# Patient Record
Sex: FEMALE | Race: WHITE | NOT HISPANIC OR LATINO | Employment: FULL TIME | URBAN - METROPOLITAN AREA
[De-identification: names, ages, dates, MRNs, and addresses within clinical notes are randomized per-mention and may not be internally consistent; named-entity substitution may affect disease eponyms.]

---

## 2018-04-18 ENCOUNTER — TRANSCRIBE ORDERS (OUTPATIENT)
Dept: ADMINISTRATIVE | Facility: HOSPITAL | Age: 58
End: 2018-04-18

## 2018-04-18 DIAGNOSIS — Z12.39 BREAST SCREENING: Primary | ICD-10-CM

## 2018-04-25 ENCOUNTER — HOSPITAL ENCOUNTER (OUTPATIENT)
Dept: MAMMOGRAPHY | Facility: CLINIC | Age: 58
Discharge: HOME | End: 2018-04-25
Payer: COMMERCIAL

## 2018-04-25 DIAGNOSIS — Z12.39 BREAST SCREENING: ICD-10-CM

## 2018-04-25 PROCEDURE — 77067 SCR MAMMO BI INCL CAD: CPT

## 2018-05-16 ENCOUNTER — HOSPITAL ENCOUNTER (OUTPATIENT)
Dept: MAMMOGRAPHY | Facility: CLINIC | Age: 58
Discharge: HOME | End: 2018-05-16
Payer: COMMERCIAL

## 2018-05-16 ENCOUNTER — HOSPITAL ENCOUNTER (OUTPATIENT)
Dept: ULTRASOUND IMAGING | Facility: CLINIC | Age: 58
Discharge: HOME | End: 2018-05-16
Payer: COMMERCIAL

## 2018-05-16 DIAGNOSIS — R92.8 ABNORMAL MAMMOGRAM: ICD-10-CM

## 2018-05-16 PROCEDURE — 77065 DX MAMMO INCL CAD UNI: CPT

## 2018-05-16 PROCEDURE — G0279 TOMOSYNTHESIS, MAMMO: HCPCS

## 2021-12-13 ENCOUNTER — TELEPHONE (OUTPATIENT)
Dept: OBGYN CLINIC | Facility: HOSPITAL | Age: 61
End: 2021-12-13

## 2021-12-14 ENCOUNTER — TELEPHONE (OUTPATIENT)
Dept: OBGYN CLINIC | Facility: HOSPITAL | Age: 61
End: 2021-12-14

## 2021-12-20 ENCOUNTER — HOSPITAL ENCOUNTER (OUTPATIENT)
Dept: RADIOLOGY | Facility: HOSPITAL | Age: 61
Discharge: HOME | End: 2021-12-20
Payer: COMMERCIAL

## 2021-12-20 ENCOUNTER — OFFICE VISIT (OUTPATIENT)
Dept: OBGYN CLINIC | Facility: HOSPITAL | Age: 61
End: 2021-12-20
Payer: COMMERCIAL

## 2021-12-20 VITALS
SYSTOLIC BLOOD PRESSURE: 170 MMHG | HEIGHT: 63 IN | HEART RATE: 86 BPM | BODY MASS INDEX: 26.01 KG/M2 | WEIGHT: 146.8 LBS | DIASTOLIC BLOOD PRESSURE: 84 MMHG

## 2021-12-20 DIAGNOSIS — M25.559 HIP PAIN: Primary | ICD-10-CM

## 2021-12-20 DIAGNOSIS — M25.559 HIP PAIN: ICD-10-CM

## 2021-12-20 DIAGNOSIS — M25.551 BILATERAL HIP PAIN: ICD-10-CM

## 2021-12-20 DIAGNOSIS — M25.552 BILATERAL HIP PAIN: ICD-10-CM

## 2021-12-20 PROCEDURE — 73521 X-RAY EXAM HIPS BI 2 VIEWS: CPT

## 2021-12-20 PROCEDURE — 99204 OFFICE O/P NEW MOD 45 MIN: CPT | Performed by: PHYSICIAN ASSISTANT

## 2021-12-20 RX ORDER — AMLODIPINE BESYLATE 5 MG/1
5 TABLET ORAL DAILY
COMMUNITY
Start: 2021-11-29

## 2021-12-20 RX ORDER — MELOXICAM 15 MG/1
15 TABLET ORAL DAILY PRN
Qty: 21 TABLET | Refills: 0 | Status: SHIPPED | OUTPATIENT
Start: 2021-12-20 | End: 2022-01-07

## 2022-01-07 DIAGNOSIS — M25.551 BILATERAL HIP PAIN: ICD-10-CM

## 2022-01-07 DIAGNOSIS — M25.552 BILATERAL HIP PAIN: ICD-10-CM

## 2022-01-07 RX ORDER — MELOXICAM 15 MG/1
TABLET ORAL
Qty: 21 TABLET | Refills: 0 | Status: ON HOLD | OUTPATIENT
Start: 2022-01-07 | End: 2022-07-06 | Stop reason: ALTCHOICE

## 2022-07-06 ENCOUNTER — ANESTHESIA (INPATIENT)
Dept: PERIOP | Facility: HOSPITAL | Age: 62
DRG: 337 | End: 2022-07-06
Payer: COMMERCIAL

## 2022-07-06 ENCOUNTER — APPOINTMENT (INPATIENT)
Dept: RADIOLOGY | Facility: HOSPITAL | Age: 62
DRG: 337 | End: 2022-07-06
Payer: COMMERCIAL

## 2022-07-06 ENCOUNTER — HOSPITAL ENCOUNTER (INPATIENT)
Facility: HOSPITAL | Age: 62
LOS: 1 days | Discharge: HOME | DRG: 337 | End: 2022-07-07
Attending: EMERGENCY MEDICINE | Admitting: SURGERY
Payer: COMMERCIAL

## 2022-07-06 ENCOUNTER — APPOINTMENT (EMERGENCY)
Dept: RADIOLOGY | Facility: HOSPITAL | Age: 62
DRG: 337 | End: 2022-07-06
Payer: COMMERCIAL

## 2022-07-06 ENCOUNTER — ANESTHESIA EVENT (INPATIENT)
Dept: PERIOP | Facility: HOSPITAL | Age: 62
DRG: 337 | End: 2022-07-06
Payer: COMMERCIAL

## 2022-07-06 DIAGNOSIS — K56.609 SMALL BOWEL OBSTRUCTION (HCC): Primary | ICD-10-CM

## 2022-07-06 LAB
ABO GROUP BLD: NORMAL
ALBUMIN SERPL BCP-MCNC: 3.6 G/DL (ref 3.5–5)
ALP SERPL-CCNC: 84 U/L (ref 46–116)
ALT SERPL W P-5'-P-CCNC: 31 U/L (ref 12–78)
ANION GAP SERPL CALCULATED.3IONS-SCNC: 5 MMOL/L (ref 4–13)
AST SERPL W P-5'-P-CCNC: 33 U/L (ref 5–45)
BASOPHILS # BLD AUTO: 0.07 THOUSANDS/ΜL (ref 0–0.1)
BASOPHILS NFR BLD AUTO: 1 % (ref 0–1)
BILIRUB SERPL-MCNC: 0.5 MG/DL (ref 0.2–1)
BILIRUB UR QL STRIP: NEGATIVE
BUN SERPL-MCNC: 21 MG/DL (ref 5–25)
CALCIUM SERPL-MCNC: 9.7 MG/DL (ref 8.3–10.1)
CHLORIDE SERPL-SCNC: 107 MMOL/L (ref 100–108)
CLARITY UR: CLEAR
CO2 SERPL-SCNC: 22 MMOL/L (ref 21–32)
COLOR UR: YELLOW
CREAT SERPL-MCNC: 0.81 MG/DL (ref 0.6–1.3)
EOSINOPHIL # BLD AUTO: 0.27 THOUSAND/ΜL (ref 0–0.61)
EOSINOPHIL NFR BLD AUTO: 3 % (ref 0–6)
ERYTHROCYTE [DISTWIDTH] IN BLOOD BY AUTOMATED COUNT: 12.5 % (ref 11.6–15.1)
FLUAV RNA RESP QL NAA+PROBE: NEGATIVE
FLUBV RNA RESP QL NAA+PROBE: NEGATIVE
GFR SERPL CREATININE-BSD FRML MDRD: 78 ML/MIN/1.73SQ M
GLUCOSE SERPL-MCNC: 105 MG/DL (ref 65–140)
GLUCOSE SERPL-MCNC: 116 MG/DL (ref 65–140)
GLUCOSE UR STRIP-MCNC: NEGATIVE MG/DL
HCT VFR BLD AUTO: 42.8 % (ref 34.8–46.1)
HGB BLD-MCNC: 14.6 G/DL (ref 11.5–15.4)
HGB UR QL STRIP.AUTO: NEGATIVE
IMM GRANULOCYTES # BLD AUTO: 0.01 THOUSAND/UL (ref 0–0.2)
IMM GRANULOCYTES NFR BLD AUTO: 0 % (ref 0–2)
KETONES UR STRIP-MCNC: ABNORMAL MG/DL
LACTATE SERPL-SCNC: 1 MMOL/L (ref 0.5–2)
LEUKOCYTE ESTERASE UR QL STRIP: NEGATIVE
LIPASE SERPL-CCNC: 175 U/L (ref 73–393)
LYMPHOCYTES # BLD AUTO: 2.72 THOUSANDS/ΜL (ref 0.6–4.47)
LYMPHOCYTES NFR BLD AUTO: 31 % (ref 14–44)
MCH RBC QN AUTO: 30.8 PG (ref 26.8–34.3)
MCHC RBC AUTO-ENTMCNC: 34.1 G/DL (ref 31.4–37.4)
MCV RBC AUTO: 90 FL (ref 82–98)
MONOCYTES # BLD AUTO: 0.47 THOUSAND/ΜL (ref 0.17–1.22)
MONOCYTES NFR BLD AUTO: 5 % (ref 4–12)
NEUTROPHILS # BLD AUTO: 5.18 THOUSANDS/ΜL (ref 1.85–7.62)
NEUTS SEG NFR BLD AUTO: 60 % (ref 43–75)
NITRITE UR QL STRIP: NEGATIVE
NRBC BLD AUTO-RTO: 0 /100 WBCS
PH UR STRIP.AUTO: 7 [PH]
PLATELET # BLD AUTO: 250 THOUSANDS/UL (ref 149–390)
PMV BLD AUTO: 9.6 FL (ref 8.9–12.7)
POTASSIUM SERPL-SCNC: 4.7 MMOL/L (ref 3.5–5.3)
PROT SERPL-MCNC: 7.2 G/DL (ref 6.4–8.2)
PROT UR STRIP-MCNC: NEGATIVE MG/DL
RBC # BLD AUTO: 4.74 MILLION/UL (ref 3.81–5.12)
RH BLD: POSITIVE
RSV RNA RESP QL NAA+PROBE: NEGATIVE
SARS-COV-2 RNA RESP QL NAA+PROBE: NEGATIVE
SODIUM SERPL-SCNC: 134 MMOL/L (ref 136–145)
SP GR UR STRIP.AUTO: 1.01 (ref 1–1.03)
UROBILINOGEN UR STRIP-ACNC: <2 MG/DL
WBC # BLD AUTO: 8.72 THOUSAND/UL (ref 4.31–10.16)

## 2022-07-06 PROCEDURE — 44005 FREEING OF BOWEL ADHESION: CPT | Performed by: SURGERY

## 2022-07-06 PROCEDURE — 74176 CT ABD & PELVIS W/O CONTRAST: CPT

## 2022-07-06 PROCEDURE — 99285 EMERGENCY DEPT VISIT HI MDM: CPT | Performed by: EMERGENCY MEDICINE

## 2022-07-06 PROCEDURE — G1004 CDSM NDSC: HCPCS

## 2022-07-06 PROCEDURE — 80053 COMPREHEN METABOLIC PANEL: CPT | Performed by: STUDENT IN AN ORGANIZED HEALTH CARE EDUCATION/TRAINING PROGRAM

## 2022-07-06 PROCEDURE — 81003 URINALYSIS AUTO W/O SCOPE: CPT | Performed by: STUDENT IN AN ORGANIZED HEALTH CARE EDUCATION/TRAINING PROGRAM

## 2022-07-06 PROCEDURE — C9290 INJ, BUPIVACAINE LIPOSOME: HCPCS | Performed by: ANESTHESIOLOGY

## 2022-07-06 PROCEDURE — 0241U HB NFCT DS VIR RESP RNA 4 TRGT: CPT | Performed by: SURGERY

## 2022-07-06 PROCEDURE — 82948 REAGENT STRIP/BLOOD GLUCOSE: CPT

## 2022-07-06 PROCEDURE — 96361 HYDRATE IV INFUSION ADD-ON: CPT

## 2022-07-06 PROCEDURE — 74177 CT ABD & PELVIS W/CONTRAST: CPT

## 2022-07-06 PROCEDURE — 83690 ASSAY OF LIPASE: CPT | Performed by: STUDENT IN AN ORGANIZED HEALTH CARE EDUCATION/TRAINING PROGRAM

## 2022-07-06 PROCEDURE — 36415 COLL VENOUS BLD VENIPUNCTURE: CPT | Performed by: STUDENT IN AN ORGANIZED HEALTH CARE EDUCATION/TRAINING PROGRAM

## 2022-07-06 PROCEDURE — 0DNW0ZZ RELEASE PERITONEUM, OPEN APPROACH: ICD-10-PCS | Performed by: SURGERY

## 2022-07-06 PROCEDURE — 99222 1ST HOSP IP/OBS MODERATE 55: CPT | Performed by: SURGERY

## 2022-07-06 PROCEDURE — 85025 COMPLETE CBC W/AUTO DIFF WBC: CPT | Performed by: STUDENT IN AN ORGANIZED HEALTH CARE EDUCATION/TRAINING PROGRAM

## 2022-07-06 PROCEDURE — 71045 X-RAY EXAM CHEST 1 VIEW: CPT

## 2022-07-06 PROCEDURE — 96374 THER/PROPH/DIAG INJ IV PUSH: CPT

## 2022-07-06 PROCEDURE — 99285 EMERGENCY DEPT VISIT HI MDM: CPT

## 2022-07-06 PROCEDURE — 83605 ASSAY OF LACTIC ACID: CPT | Performed by: STUDENT IN AN ORGANIZED HEALTH CARE EDUCATION/TRAINING PROGRAM

## 2022-07-06 RX ORDER — MAGNESIUM HYDROXIDE 1200 MG/15ML
LIQUID ORAL AS NEEDED
Status: DISCONTINUED | OUTPATIENT
Start: 2022-07-06 | End: 2022-07-06 | Stop reason: HOSPADM

## 2022-07-06 RX ORDER — OXYCODONE HYDROCHLORIDE 5 MG/1
5 TABLET ORAL EVERY 4 HOURS PRN
Status: DISCONTINUED | OUTPATIENT
Start: 2022-07-06 | End: 2022-07-07 | Stop reason: HOSPADM

## 2022-07-06 RX ORDER — PROPOFOL 10 MG/ML
INJECTION, EMULSION INTRAVENOUS AS NEEDED
Status: DISCONTINUED | OUTPATIENT
Start: 2022-07-06 | End: 2022-07-06

## 2022-07-06 RX ORDER — HYDROMORPHONE HCL/PF 1 MG/ML
0.5 SYRINGE (ML) INJECTION
Status: DISCONTINUED | OUTPATIENT
Start: 2022-07-06 | End: 2022-07-07

## 2022-07-06 RX ORDER — LIDOCAINE HYDROCHLORIDE 10 MG/ML
INJECTION, SOLUTION EPIDURAL; INFILTRATION; INTRACAUDAL; PERINEURAL AS NEEDED
Status: DISCONTINUED | OUTPATIENT
Start: 2022-07-06 | End: 2022-07-06

## 2022-07-06 RX ORDER — HYDROMORPHONE HCL IN WATER/PF 6 MG/30 ML
0.2 PATIENT CONTROLLED ANALGESIA SYRINGE INTRAVENOUS
Status: DISCONTINUED | OUTPATIENT
Start: 2022-07-06 | End: 2022-07-06

## 2022-07-06 RX ORDER — GABAPENTIN 100 MG/1
100 CAPSULE ORAL 3 TIMES DAILY
Status: DISCONTINUED | OUTPATIENT
Start: 2022-07-06 | End: 2022-07-07 | Stop reason: HOSPADM

## 2022-07-06 RX ORDER — SUCCINYLCHOLINE/SOD CL,ISO/PF 100 MG/5ML
SYRINGE (ML) INTRAVENOUS AS NEEDED
Status: DISCONTINUED | OUTPATIENT
Start: 2022-07-06 | End: 2022-07-06

## 2022-07-06 RX ORDER — ACETAMINOPHEN 325 MG/1
975 TABLET ORAL EVERY 8 HOURS SCHEDULED
Status: DISCONTINUED | OUTPATIENT
Start: 2022-07-06 | End: 2022-07-07 | Stop reason: HOSPADM

## 2022-07-06 RX ORDER — SODIUM CHLORIDE, SODIUM LACTATE, POTASSIUM CHLORIDE, CALCIUM CHLORIDE 600; 310; 30; 20 MG/100ML; MG/100ML; MG/100ML; MG/100ML
100 INJECTION, SOLUTION INTRAVENOUS CONTINUOUS
Status: DISCONTINUED | OUTPATIENT
Start: 2022-07-06 | End: 2022-07-07

## 2022-07-06 RX ORDER — SODIUM CHLORIDE 9 MG/ML
INJECTION, SOLUTION INTRAVENOUS CONTINUOUS PRN
Status: DISCONTINUED | OUTPATIENT
Start: 2022-07-06 | End: 2022-07-06

## 2022-07-06 RX ORDER — METRONIDAZOLE 500 MG/100ML
INJECTION, SOLUTION INTRAVENOUS CONTINUOUS PRN
Status: DISCONTINUED | OUTPATIENT
Start: 2022-07-06 | End: 2022-07-06

## 2022-07-06 RX ORDER — XYLITOL/YERBA SANTA
5 AEROSOL, SPRAY WITH PUMP (ML) MUCOUS MEMBRANE 4 TIMES DAILY PRN
Status: DISCONTINUED | OUTPATIENT
Start: 2022-07-06 | End: 2022-07-06

## 2022-07-06 RX ORDER — FENTANYL CITRATE 50 UG/ML
INJECTION, SOLUTION INTRAMUSCULAR; INTRAVENOUS AS NEEDED
Status: DISCONTINUED | OUTPATIENT
Start: 2022-07-06 | End: 2022-07-06

## 2022-07-06 RX ORDER — CEFAZOLIN SODIUM 1 G/3ML
INJECTION, POWDER, FOR SOLUTION INTRAMUSCULAR; INTRAVENOUS AS NEEDED
Status: DISCONTINUED | OUTPATIENT
Start: 2022-07-06 | End: 2022-07-06

## 2022-07-06 RX ORDER — LABETALOL HYDROCHLORIDE 5 MG/ML
10 INJECTION, SOLUTION INTRAVENOUS EVERY 4 HOURS PRN
Status: DISCONTINUED | OUTPATIENT
Start: 2022-07-06 | End: 2022-07-07 | Stop reason: HOSPADM

## 2022-07-06 RX ORDER — HEPARIN SODIUM 5000 [USP'U]/ML
5000 INJECTION, SOLUTION INTRAVENOUS; SUBCUTANEOUS EVERY 8 HOURS SCHEDULED
Status: DISCONTINUED | OUTPATIENT
Start: 2022-07-06 | End: 2022-07-07 | Stop reason: HOSPADM

## 2022-07-06 RX ORDER — BUPIVACAINE HYDROCHLORIDE 2.5 MG/ML
INJECTION, SOLUTION EPIDURAL; INFILTRATION; INTRACAUDAL AS NEEDED
Status: DISCONTINUED | OUTPATIENT
Start: 2022-07-06 | End: 2022-07-06

## 2022-07-06 RX ORDER — FLUTICASONE PROPIONATE 50 MCG
1 SPRAY, SUSPENSION (ML) NASAL DAILY
COMMUNITY

## 2022-07-06 RX ORDER — HYDROMORPHONE HCL/PF 1 MG/ML
0.5 SYRINGE (ML) INJECTION ONCE
Status: COMPLETED | OUTPATIENT
Start: 2022-07-06 | End: 2022-07-06

## 2022-07-06 RX ORDER — HYDROMORPHONE HCL/PF 1 MG/ML
0.4 SYRINGE (ML) INJECTION
Status: DISCONTINUED | OUTPATIENT
Start: 2022-07-06 | End: 2022-07-06 | Stop reason: HOSPADM

## 2022-07-06 RX ORDER — ROCURONIUM BROMIDE 10 MG/ML
INJECTION, SOLUTION INTRAVENOUS AS NEEDED
Status: DISCONTINUED | OUTPATIENT
Start: 2022-07-06 | End: 2022-07-06

## 2022-07-06 RX ORDER — FENTANYL CITRATE/PF 50 MCG/ML
25 SYRINGE (ML) INJECTION
Status: DISCONTINUED | OUTPATIENT
Start: 2022-07-06 | End: 2022-07-06 | Stop reason: HOSPADM

## 2022-07-06 RX ORDER — OXYCODONE HYDROCHLORIDE 10 MG/1
10 TABLET ORAL EVERY 4 HOURS PRN
Status: DISCONTINUED | OUTPATIENT
Start: 2022-07-06 | End: 2022-07-07 | Stop reason: HOSPADM

## 2022-07-06 RX ORDER — ONDANSETRON 2 MG/ML
4 INJECTION INTRAMUSCULAR; INTRAVENOUS EVERY 6 HOURS PRN
Status: DISCONTINUED | OUTPATIENT
Start: 2022-07-06 | End: 2022-07-07 | Stop reason: HOSPADM

## 2022-07-06 RX ORDER — METHOCARBAMOL 500 MG/1
500 TABLET, FILM COATED ORAL EVERY 6 HOURS SCHEDULED
Status: DISCONTINUED | OUTPATIENT
Start: 2022-07-06 | End: 2022-07-07 | Stop reason: HOSPADM

## 2022-07-06 RX ORDER — MIDAZOLAM HYDROCHLORIDE 2 MG/2ML
INJECTION, SOLUTION INTRAMUSCULAR; INTRAVENOUS AS NEEDED
Status: DISCONTINUED | OUTPATIENT
Start: 2022-07-06 | End: 2022-07-06

## 2022-07-06 RX ADMIN — HYDROMORPHONE HYDROCHLORIDE 0.2 MG: 0.2 INJECTION, SOLUTION INTRAMUSCULAR; INTRAVENOUS; SUBCUTANEOUS at 10:25

## 2022-07-06 RX ADMIN — CEFAZOLIN SODIUM 2000 MG: 1 INJECTION, POWDER, FOR SOLUTION INTRAMUSCULAR; INTRAVENOUS at 14:25

## 2022-07-06 RX ADMIN — METHOCARBAMOL 500 MG: 500 TABLET ORAL at 19:51

## 2022-07-06 RX ADMIN — METRONIDAZOLE: 500 INJECTION, SOLUTION INTRAVENOUS at 14:22

## 2022-07-06 RX ADMIN — MIDAZOLAM 2 MG: 1 INJECTION INTRAMUSCULAR; INTRAVENOUS at 14:06

## 2022-07-06 RX ADMIN — BUPIVACAINE 20 ML: 13.3 INJECTION, SUSPENSION, LIPOSOMAL INFILTRATION at 15:15

## 2022-07-06 RX ADMIN — FENTANYL CITRATE 25 MCG: 50 INJECTION INTRAMUSCULAR; INTRAVENOUS at 14:11

## 2022-07-06 RX ADMIN — FENTANYL CITRATE 25 MCG: 50 INJECTION INTRAMUSCULAR; INTRAVENOUS at 14:39

## 2022-07-06 RX ADMIN — Medication 1 SPRAY: at 12:09

## 2022-07-06 RX ADMIN — SUGAMMADEX 200 MG: 100 INJECTION, SOLUTION INTRAVENOUS at 15:17

## 2022-07-06 RX ADMIN — HYDROMORPHONE HYDROCHLORIDE 0.5 MG: 1 INJECTION, SOLUTION INTRAMUSCULAR; INTRAVENOUS; SUBCUTANEOUS at 07:42

## 2022-07-06 RX ADMIN — OXYCODONE HYDROCHLORIDE 5 MG: 5 TABLET ORAL at 19:50

## 2022-07-06 RX ADMIN — SODIUM CHLORIDE, SODIUM LACTATE, POTASSIUM CHLORIDE, AND CALCIUM CHLORIDE 100 ML/HR: .6; .31; .03; .02 INJECTION, SOLUTION INTRAVENOUS at 21:09

## 2022-07-06 RX ADMIN — Medication 1 SPRAY: at 10:19

## 2022-07-06 RX ADMIN — SODIUM CHLORIDE: 0.9 INJECTION, SOLUTION INTRAVENOUS at 14:21

## 2022-07-06 RX ADMIN — SODIUM CHLORIDE, SODIUM LACTATE, POTASSIUM CHLORIDE, AND CALCIUM CHLORIDE 125 ML/HR: .6; .31; .03; .02 INJECTION, SOLUTION INTRAVENOUS at 10:20

## 2022-07-06 RX ADMIN — NICOTINE 1 PATCH: 7 PATCH, EXTENDED RELEASE TRANSDERMAL at 10:19

## 2022-07-06 RX ADMIN — SODIUM CHLORIDE, SODIUM LACTATE, POTASSIUM CHLORIDE, AND CALCIUM CHLORIDE: .6; .31; .03; .02 INJECTION, SOLUTION INTRAVENOUS at 14:53

## 2022-07-06 RX ADMIN — FENTANYL CITRATE 25 MCG: 50 INJECTION INTRAMUSCULAR; INTRAVENOUS at 15:07

## 2022-07-06 RX ADMIN — PROPOFOL 50 MG: 10 INJECTION, EMULSION INTRAVENOUS at 15:14

## 2022-07-06 RX ADMIN — ACETAMINOPHEN 975 MG: 325 TABLET, FILM COATED ORAL at 21:05

## 2022-07-06 RX ADMIN — ROCURONIUM BROMIDE 40 MG: 10 INJECTION INTRAVENOUS at 14:18

## 2022-07-06 RX ADMIN — FENTANYL CITRATE 25 MCG: 50 INJECTION INTRAMUSCULAR; INTRAVENOUS at 15:16

## 2022-07-06 RX ADMIN — Medication 100 MG: at 14:12

## 2022-07-06 RX ADMIN — BUPIVACAINE HYDROCHLORIDE 15 ML: 2.5 INJECTION, SOLUTION EPIDURAL; INFILTRATION; INTRACAUDAL at 15:15

## 2022-07-06 RX ADMIN — GABAPENTIN 100 MG: 100 CAPSULE ORAL at 21:05

## 2022-07-06 RX ADMIN — SODIUM CHLORIDE 1000 ML: 0.9 INJECTION, SOLUTION INTRAVENOUS at 07:44

## 2022-07-06 RX ADMIN — IOHEXOL 65 ML: 350 INJECTION, SOLUTION INTRAVENOUS at 10:47

## 2022-07-06 RX ADMIN — ONDANSETRON 4 MG: 2 INJECTION INTRAMUSCULAR; INTRAVENOUS at 14:41

## 2022-07-06 RX ADMIN — LIDOCAINE HYDROCHLORIDE 50 MG: 10 INJECTION, SOLUTION EPIDURAL; INFILTRATION; INTRACAUDAL at 14:11

## 2022-07-06 RX ADMIN — HEPARIN SODIUM 5000 UNITS: 5000 INJECTION INTRAVENOUS; SUBCUTANEOUS at 21:05

## 2022-07-06 RX ADMIN — BUPIVACAINE HYDROCHLORIDE 15 ML: 2.5 INJECTION, SOLUTION EPIDURAL; INFILTRATION; INTRACAUDAL at 15:10

## 2022-07-06 RX ADMIN — PROPOFOL 150 MG: 10 INJECTION, EMULSION INTRAVENOUS at 14:11

## 2022-07-06 NOTE — ANESTHESIA PREPROCEDURE EVALUATION
Procedure:  LAPAROSCOPY DIAGNOSTIC (N/A Abdomen)  LAPAROTOMY EXPLORATORY W/ BOWEL RESECTION (N/A Abdomen)    SBO    Relevant Problems   CARDIO   (+) Hypertension        Physical Exam    Airway    Mallampati score: I  TM Distance: >3 FB  Neck ROM: full     Dental   upper dentures and lower dentures,     Cardiovascular      Pulmonary      Other Findings        Anesthesia Plan  ASA Score- 2 Emergent    Anesthesia Type- general with ASA Monitors  Additional Monitors:   Airway Plan: ETT  Comment: Consented for pre-emergence TAP blocks  Plan Factors-    Chart reviewed  Existing labs reviewed  Patient summary reviewed  Patient is a current smoker  Patient not instructed to abstain from smoking on day of procedure  Patient smoked on day of surgery  Induction- intravenous and rapid sequence induction  Postoperative Plan- Plan for postoperative opioid use  Planned trial extubation    Informed Consent- Anesthetic plan and risks discussed with patient  I personally reviewed this patient with the CRNA  Discussed and agreed on the Anesthesia Plan with the CRNA  Sherry Delaney

## 2022-07-06 NOTE — QUICK NOTE
General Surgery Post-Op Check  Desean Mary 64 y o  female MRN: [id number]  Unit/Bed#: OR POOL Encounter: [id number]     S:   Patient seen and examined in PACU, as patient currently awaiting bed still  No acute complaints  No nausea or emesis  Pain well controlled      O:   Vitals:    07/06/22 1700   BP: 148/75   Pulse: 66   Resp: 14   Temp:    SpO2: 97%     I/O       07/04 0701 07/05 0700 07/05 0701 07/06 0700 07/06 0701 07/07 0700    I V    800    IV Piggyback   1100    Total Intake   1900    Urine   300    Blood   25    Total Output   325    Net   +1575               PE:  Gen:  No acute distress  CV:  Warm, well-perfused  Lung:  Normal work of breathing, no respiratory distress  Abd:  Soft, appropriately tender, dressing c/d/i  Ext:  Moving all extremities  Neuro:  Alert and oriented, M/S grossly intact     Lab Results   Component Value Date    WBC 8 72 07/06/2022    HGB 14 6 07/06/2022    HCT 42 8 07/06/2022    MCV 90 07/06/2022     07/06/2022     Lab Results   Component Value Date    CALCIUM 9 7 07/06/2022    K 4 7 07/06/2022    CO2 22 07/06/2022     07/06/2022    BUN 21 07/06/2022    CREATININE 0 81 07/06/2022         A/P: 64 y o  female Day of Surgery s/p Procedure(s) (LRB):  LAPAROTOMY EXPLORATORY, LYSIS OF ADHESIONS (N/A)    Plan:   CLD   DVT ppx   Out of bed, encourage ambulation   Encourage incentive spirometer use   Strict I's and O's   Pain and nausea control p r n   o B/l TAP blocks performed intraop   Please tiger text on call surgery resident for questions or concerns      Augustin Olivarez MD  PGY1, General Surgery

## 2022-07-06 NOTE — H&P
Acute Care Surgery  History and Physical  Rob Crockett 64 y o  female MRN: [id number]  Unit/Bed#: ED 23 Encounter: [id number]    Assessment and Plan:  Rob Crockett is a 64 y o  female with a PMHx HTN, prior tubal ligation (approx 30 years ago), hysteroscopy w/endometrial ablation, who presents with acute abdominal pain c/f SBO  CT scan without contrast c/f swirling of mesentery   VSS, labs unremarkable  Abdomin tender intraumbilical/LLQ but w/o peritoneal signs    Plan:  Repeat CTAP w/IV contrast to r/o internal hernia  NPO  IVF  NGT - monitor outpt and character, low continuous sxn  Surgery admission        History of Present Illness     HPI:  Rob Crockett is a 64 y o  female w/PMHx HTN who presents with acute onset abdominal pain  Patient states she had two BMs earlier this morning  She subsequently developed acute abdominal pain in her lower abdomin  She denies nausea or emesis  No fevers or chills  Prior surgical history including tubal ligation approx 30 years ago and hysteroscopy w/endometrial ablation  States she is burping but not passing flatus at this time  VSS, labs unremarkable  CT c/f SBO w/swirling of mesentery, no defined transition pt however, collapsed SB leading to TI  Review of Systems    Historical Information   Past Medical History:   Diagnosis Date    Hypertension      No past surgical history on file  Social History   Social History     Substance and Sexual Activity   Alcohol Use Not Currently     Social History     Substance and Sexual Activity   Drug Use Not Currently     Social History     Tobacco Use   Smoking Status Current Every Day Smoker   Smokeless Tobacco Never Used     Family History: No family history on file  Meds/Allergies   PTA meds:   Prior to Admission Medications   Prescriptions Last Dose Informant Patient Reported? Taking?    amLODIPine (NORVASC) 5 mg tablet   Yes No   meloxicam (MOBIC) 15 mg tablet   No No   Sig: TAKE 1 TABLET BY MOUTH DAILY AS NEEDED FOR MODERATE PAIN  Facility-Administered Medications: None     No Known Allergies    Objective   First Vitals:   Blood Pressure: (!) 191/84 (07/06/22 0713)  Pulse: 72 (07/06/22 0713)  Temperature: 98 2 °F (36 8 °C) (07/06/22 0713)  Temp Source: Tympanic (07/06/22 0713)  Respirations: 22 (07/06/22 0713)  SpO2: 98 % (07/06/22 0713)    Current Vitals:   Blood Pressure: (!) 191/84 (07/06/22 0713)  Pulse: 72 (07/06/22 0713)  Temperature: 98 2 °F (36 8 °C) (07/06/22 0713)  Temp Source: Tympanic (07/06/22 0713)  Respirations: 22 (07/06/22 0713)  SpO2: 98 % (07/06/22 0713)      Intake/Output Summary (Last 24 hours) at 7/6/2022 0944  Last data filed at 7/6/2022 0856  Gross per 24 hour   Intake 1000 ml   Output --   Net 1000 ml       Invasive Devices  Report    Peripheral Intravenous Line  Duration           Peripheral IV 07/06/22 Right Forearm <1 day          Drain  Duration           NG/OG/Enteral Tube Nasogastric 14 Fr Left nare <1 day                Physical Exam  Vitals reviewed  Constitutional:       Appearance: Normal appearance  She is not ill-appearing or toxic-appearing  HENT:      Head: Normocephalic and atraumatic  Right Ear: External ear normal       Left Ear: External ear normal       Nose: Nose normal       Mouth/Throat:      Mouth: Mucous membranes are moist       Pharynx: Oropharynx is clear  Eyes:      Extraocular Movements: Extraocular movements intact  Conjunctiva/sclera: Conjunctivae normal    Cardiovascular:      Rate and Rhythm: Normal rate  Pulmonary:      Effort: Pulmonary effort is normal  No respiratory distress  Abdominal:      General: Abdomen is flat  There is no distension  Palpations: Abdomen is soft  Tenderness: There is abdominal tenderness (infraumbilical/LLQ)  There is no rebound  Genitourinary:     Comments: deferred  Musculoskeletal:         General: Normal range of motion  Cervical back: Normal range of motion     Skin:     General: Skin is warm and dry    Neurological:      General: No focal deficit present  Mental Status: She is alert  Cranial Nerves: No cranial nerve deficit  Motor: No weakness  Psychiatric:         Mood and Affect: Mood normal          Behavior: Behavior normal          Thought Content: Thought content normal          Lab Results:   CBC:   Lab Results   Component Value Date    WBC 8 72 07/06/2022    HGB 14 6 07/06/2022    HCT 42 8 07/06/2022    MCV 90 07/06/2022     07/06/2022    MCH 30 8 07/06/2022    MCHC 34 1 07/06/2022    RDW 12 5 07/06/2022    MPV 9 6 07/06/2022    NRBC 0 07/06/2022   , CMP:   Lab Results   Component Value Date    SODIUM 134 (L) 07/06/2022    K 4 7 07/06/2022     07/06/2022    CO2 22 07/06/2022    BUN 21 07/06/2022    CREATININE 0 81 07/06/2022    CALCIUM 9 7 07/06/2022    AST 33 07/06/2022    ALT 31 07/06/2022    ALKPHOS 84 07/06/2022    EGFR 78 07/06/2022   , Coagulation: No results found for: PT, INR, APTT, Urinalysis:   Lab Results   Component Value Date    COLORU Yellow 07/06/2022    CLARITYU Clear 07/06/2022    SPECGRAV 1 009 07/06/2022    PHUR 7 0 07/06/2022    LEUKOCYTESUR Negative 07/06/2022    NITRITE Negative 07/06/2022    GLUCOSEU Negative 07/06/2022    KETONESU Trace (A) 07/06/2022    BILIRUBINUR Negative 07/06/2022    BLOODU Negative 07/06/2022   , Amylase: No results found for: AMYLASE, Lipase:   Lab Results   Component Value Date    LIPASE 175 07/06/2022     Imaging: I have personally reviewed pertinent reports  EKG, Pathology, and Other Studies: I have personally reviewed pertinent reports  Code Status: Level 1 - Full Code  Advance Directive and Living Will:      Power of :    POLST:      Counseling / Coordination of Care  Total floor / unit time spent today 30 minutes  This involved direct patient contact where I performed a full history and physical, reviewed previous records, and reviewed laboratory and other diagnostic studies   Greater than 50% of total time was spent with the patient and / or family counseling and / or coordination of care      Augustin Olivarez MD  7/6/2022

## 2022-07-06 NOTE — OP NOTE
OPERATIVE REPORT  PATIENT NAME: Chris Ye    :  1960  MRN: [id number]  Pt Location: [facility] OR ROOM 07    SURGERY DATE: 2022    Surgeon(s) and Role:     * Aki Son DO - Primary     * Dom Mayer MD - Assisting     Stewart Carvajal DO -  [facility], [place] - Assisting    Preop Diagnosis:  Small bowel obstruction ([address] ) [K56 609]    Post-Op Diagnosis Codes:     * Small bowel obstruction ([address] ) [R35 [id number]]    Procedure(s) (LRB):  LAPAROTOMY EXPLORATORY, LYSIS OF ADHESIONS (N/A)    Specimen(s):  * No specimens in log *    Estimated Blood Loss:   Minimal    Drains:  NG/OG/Enteral Tube Nasogastric 14 Fr Left nare (Active)   Placement Reverification Aspiration 22 0937   Status Suction-low continuous 22 0937   Number of days: 0       [REMOVED] Urethral Catheter Latex 16 Fr  (Removed)   Number of days: 0       Anesthesia Type:   General    Operative Indications:  Small bowel obstruction ([address] ) [B27 [id number]]      Operative Findings:  Single band adhesion in pelvis causing an internal hernia  All viable and healthy bowel    Complications:   None    Procedure and Technique:  Patient was met in the preop holding area and was identified by name and patient identification bracelet  She was brought to the operating room and placed in the supine position  General endotracheal anesthesia was induced  A karimi catheter was placed  A time-out was called and all parties were in agreement  A vertical lower midline incision was made from just below to the umbilicus to just above the pubic tubercle  This was deepened through the subcutaneous tissues and hemostasis was achieved with electocautery  The linea alba was identified and incised and the peritoneal cavity was entered  Upon entering the abdominal cavity mildly dilated small bowel was encountered  Careful exploration of the abdomen was then performed  The small bowel was run from the ligament of Treitz to the ileocecal valve   The bowel was noted to mildly dilated proximally  There was an adhesive band in the pelvis causing an internal hernia with decompressed loops distally  This was divided sharply  The entire small bowel was inspected for viability and the absence of any additional obstructing bands  The small bowel was once again ran from the ligament of treitz to the ilecocecal valve and no abnormalities were appreciated  The colon was examined and appeared normal  The abdomen was then copiously irrigated with warm normal saline  The fascia was then closed with two running 0 non looped PDS sutures which were tied in the middle  The skin was closed with staples and covered with a Mepilex dressing  The patient was extubated and brought to PACU in stable condition  Instrument, sponge, and needle counts were correct and confirmed by RF dominga Walton was present for the entire procedure      Patient Disposition:  PACU       SIGNATURE: Christo Uribe,   DATE: July 6, 2022  TIME: 3:09 PM

## 2022-07-06 NOTE — ED PROVIDER NOTES
History  Chief Complaint   Patient presents with    Abdominal Pain     Pt woke up this morning with abdominal pain  Denies NVD  BMx2 this morning, denies trouble urinating     HPI  63 yo female with past medical history of hypertension who presents complaint of abdominal pain  The pain began this morning and is constant and has progressively gotten worse  The pain is suprapubic and radiates the left flank  She denies any fever, chills, nausea, vomiting  She has had 2 bowel movements this morning which were normal nonbloody  She states the pain is at 8/10  Pain improves when she lays down she cannot describe any other palliative or exacerbating factors  She denies any chest pain or shortness of breath  Prior to Admission Medications   Prescriptions Last Dose Informant Patient Reported? Taking? amLODIPine (NORVASC) 5 mg tablet 2022 at Unknown time  Yes Yes   Sig: Take 5 mg by mouth daily   fluticasone (FLONASE) 50 mcg/act nasal spray   Yes Yes   Si spray into each nostril daily      Facility-Administered Medications: None       Past Medical History:   Diagnosis Date    Hypertension        Past Surgical History:   Procedure Laterality Date    EXPLORATORY LAPAROTOMY W/ BOWEL RESECTION N/A 2022    Procedure: LAPAROTOMY EXPLORATORY, LYSIS OF ADHESIONS;  Surgeon: Demar Sparks DO;  Location: [facility];  Service: General       No family history on file  I have reviewed and agree with the history as documented  E-Cigarette/Vaping    E-Cigarette Use Never User      E-Cigarette/Vaping Substances     Social History     Tobacco Use    Smoking status: Current Every Day Smoker    Smokeless tobacco: Never Used   Vaping Use    Vaping Use: Never used   Substance Use Topics    Alcohol use: Not Currently    Drug use: Not Currently        Review of Systems   Constitutional: Negative for chills and fever  HENT: Negative for congestion, ear pain, rhinorrhea and sore throat      Eyes: Negative for pain    Respiratory: Negative for apnea, cough, choking, chest tightness, shortness of breath, wheezing and stridor  Cardiovascular: Negative for chest pain, palpitations and leg swelling  Gastrointestinal: Positive for abdominal pain  Negative for constipation, diarrhea, nausea and vomiting  Genitourinary: Negative for hematuria  Musculoskeletal: Negative for arthralgias and back pain  Skin: Negative for rash and wound  Neurological: Negative for dizziness  Psychiatric/Behavioral: Negative for agitation and hallucinations  All other systems reviewed and are negative  Physical Exam  ED Triage Vitals [07/06/22 0713]   Temperature Pulse Respirations Blood Pressure SpO2   98 2 °F (36 8 °C) 72 22 (!) 191/84 98 %      Temp Source Heart Rate Source Patient Position - Orthostatic VS BP Location FiO2 (%)   Tympanic Monitor Lying Left arm --      Pain Score       8             Orthostatic Vital Signs  Vitals:    07/06/22 2155 07/06/22 2259 07/07/22 0246 07/07/22 0731   BP: 131/67 130/67 129/64 131/63   Pulse: 70 70 70 64   Patient Position - Orthostatic VS:           Physical Exam  Vitals reviewed  Constitutional:       General: She is not in acute distress  Appearance: She is well-developed  HENT:      Head: Normocephalic and atraumatic  Right Ear: External ear normal       Left Ear: External ear normal       Nose: Nose normal  No congestion or rhinorrhea  Mouth/Throat:      Mouth: Mucous membranes are moist       Pharynx: No oropharyngeal exudate or posterior oropharyngeal erythema  Eyes:      General:         Right eye: No discharge  Left eye: No discharge  Extraocular Movements: Extraocular movements intact  Conjunctiva/sclera: Conjunctivae normal       Pupils: Pupils are equal, round, and reactive to light  Cardiovascular:      Rate and Rhythm: Normal rate and regular rhythm  Pulses: Normal pulses  Heart sounds: Normal heart sounds     Pulmonary: Effort: Pulmonary effort is normal  No respiratory distress  Breath sounds: Normal breath sounds  No wheezing or rales  Abdominal:      Palpations: Abdomen is soft  Tenderness: There is no abdominal tenderness  Musculoskeletal:         General: No tenderness  Normal range of motion  Cervical back: Normal range of motion and neck supple  No rigidity  Skin:     General: Skin is warm  Findings: No erythema or rash  Neurological:      General: No focal deficit present  Mental Status: She is alert and oriented to person, place, and time  Cranial Nerves: No cranial nerve deficit  Sensory: No sensory deficit  Motor: No weakness        Coordination: Coordination normal    Psychiatric:         Mood and Affect: Mood normal          ED Medications  Medications   HYDROmorphone (DILAUDID) injection 0 5 mg (0 5 mg Intravenous Given 7/6/22 0742)   sodium chloride 0 9 % bolus 1,000 mL (0 mL Intravenous Stopped 7/6/22 0856)   iohexol (OMNIPAQUE) 350 MG/ML injection (MULTI-DOSE) 65 mL (65 mL Intravenous Given 7/6/22 1047)   potassium chloride oral solution 40 mEq (40 mEq Oral Given 7/7/22 1235)       Diagnostic Studies  Results Reviewed     Procedure Component Value Units Date/Time    Basic metabolic panel [[id number]] Collected: 07/07/22 0540    Lab Status: Final result Specimen: Blood from Arm, Left Updated: 07/07/22 3118     Sodium 139 mmol/L      Potassium 3 8 mmol/L      Chloride 108 mmol/L      CO2 25 mmol/L      ANION GAP 6 mmol/L      BUN 9 mg/dL      Creatinine 0 69 mg/dL      Glucose 110 mg/dL      Calcium 9 1 mg/dL      eGFR 94 ml/min/1 73sq m     Narrative:      [place] guidelines for Chronic Kidney Disease (CKD):     Stage 1 with normal or high GFR (GFR > 90 mL/min/1 73 square meters)    Stage 2 Mild CKD (GFR = 60-89 mL/min/1 73 square meters)    Stage 3A Moderate CKD (GFR = 45-59 mL/min/1 73 square meters)    Stage 3B Moderate CKD (GFR = 30-44 mL/min/1 73 square meters)    Stage 4 Severe CKD (GFR = 15-29 mL/min/1 73 square meters)    Stage 5 End Stage CKD (GFR <15 mL/min/1 73 square meters)  Note: GFR calculation is accurate only with a steady state creatinine    CBC and differential [[id number]] Collected: 07/07/22 0540    Lab Status: Final result Specimen: Blood from Arm, Left Updated: 07/07/22 0635     WBC 10 09 Thousand/uL      RBC 4 53 Million/uL      Hemoglobin 13 9 g/dL      Hematocrit 42 8 %      MCV 95 fL      MCH 30 7 pg      MCHC 32 5 g/dL      RDW 12 6 %      MPV 9 6 fL      Platelets 383 Thousands/uL      nRBC 0 /100 WBCs      Neutrophils Relative 72 %      Immat GRANS % 0 %      Lymphocytes Relative 20 %      Monocytes Relative 6 %      Eosinophils Relative 2 %      Basophils Relative 0 %      Neutrophils Absolute 7 20 Thousands/µL      Immature Grans Absolute 0 02 Thousand/uL      Lymphocytes Absolute 2 05 Thousands/µL      Monocytes Absolute 0 60 Thousand/µL      Eosinophils Absolute 0 18 Thousand/µL      Basophils Absolute 0 04 Thousands/µL     FLU/RSV/COVID - if FLU/RSV clinically relevant [[id number]]  (Normal) Collected: 07/06/22 1209    Lab Status: Final result Specimen: Nares from Nose Updated: 07/06/22 1306     SARS-CoV-2 Negative     INFLUENZA A PCR Negative     INFLUENZA B PCR Negative     RSV PCR Negative    Narrative:      FOR PEDIATRIC PATIENTS - copy/paste COVID Guidelines URL to browser: [email or web address]    SARS-CoV-2 assay is a Nucleic Acid Amplification assay intended for the  qualitative detection of nucleic acid from SARS-CoV-2 in nasopharyngeal  swabs  Results are for the presumptive identification of SARS-CoV-2 RNA  Positive results are indicative of infection with SARS-CoV-2, the virus  causing COVID-19, but do not rule out bacterial infection or co-infection  with other viruses   Laboratories within the [place] and its  territories are required to report all positive results to the appropriate  public health authorities  Negative results do not preclude SARS-CoV-2  infection and should not be used as the sole basis for treatment or other  patient management decisions  Negative results must be combined with  clinical observations, patient history, and epidemiological information  This test has not been FDA cleared or approved  This test has been authorized by FDA under an Emergency Use Authorization  (EUA)  This test is only authorized for the duration of time the  declaration that circumstances exist justifying the authorization of the  emergency use of an in vitro diagnostic tests for detection of SARS-CoV-2  virus and/or diagnosis of COVID-19 infection under section 564(b)(1) of  the Act, 21 U  S C  674CTF-7(G)(8), unless the authorization is terminated  or revoked sooner  The test has been validated but independent review by FDA  and CLIA is pending  Test performed using [employer] GeneXpert: This RT-PCR assay targets N2,  a region unique to SARS-CoV-2  A conserved region in the E-gene was chosen  for pan-Sarbecovirus detection which includes SARS-CoV-2  Lactic acid, plasma [[id number]]  (Normal) Collected: 07/06/22 0857    Lab Status: Final result Specimen: Blood from Arm, Right Updated: 07/06/22 0935     LACTIC ACID 1 0 mmol/L     Narrative:      Result may be elevated if tourniquet was used during collection      UA w Reflex to Microscopic w Reflex to Culture [[id number]]  (Abnormal) Collected: 07/06/22 0915    Lab Status: Final result Specimen: Urine, Other Updated: 07/06/22 0927     Color, UA Yellow     Clarity, UA Clear     Specific Gravity, UA 1 009     pH, UA 7 0     Leukocytes, UA Negative     Nitrite, UA Negative     Protein, UA Negative mg/dl      Glucose, UA Negative mg/dl      Ketones, UA Trace mg/dl      Urobilinogen, UA <2 0 mg/dl      Bilirubin, UA Negative     Occult Blood, UA Negative    CMP [[id number]]  (Abnormal) Collected: 07/06/22 4399    Lab Status: Final result Specimen: Blood from Arm, Right Updated: 07/06/22 0810     Sodium 134 mmol/L      Potassium 4 7 mmol/L      Chloride 107 mmol/L      CO2 22 mmol/L      ANION GAP 5 mmol/L      BUN 21 mg/dL      Creatinine 0 81 mg/dL      Glucose 116 mg/dL      Calcium 9 7 mg/dL      AST 33 U/L      ALT 31 U/L      Alkaline Phosphatase 84 U/L      Total Protein 7 2 g/dL      Albumin 3 6 g/dL      Total Bilirubin 0 50 mg/dL      eGFR 78 ml/min/1 73sq m     Narrative:      National Kidney Disease Foundation guidelines for Chronic Kidney Disease (CKD):     Stage 1 with normal or high GFR (GFR > 90 mL/min/1 73 square meters)    Stage 2 Mild CKD (GFR = 60-89 mL/min/1 73 square meters)    Stage 3A Moderate CKD (GFR = 45-59 mL/min/1 73 square meters)    Stage 3B Moderate CKD (GFR = 30-44 mL/min/1 73 square meters)    Stage 4 Severe CKD (GFR = 15-29 mL/min/1 73 square meters)    Stage 5 End Stage CKD (GFR <15 mL/min/1 73 square meters)  Note: GFR calculation is accurate only with a steady state creatinine    Lipase [[id number]]  (Normal) Collected: 07/06/22 0746    Lab Status: Final result Specimen: Blood from Arm, Right Updated: 07/06/22 0810     Lipase 175 u/L     CBC and differential [[id number]] Collected: 07/06/22 0746    Lab Status: Final result Specimen: Blood from Arm, Right Updated: 07/06/22 0752     WBC 8 72 Thousand/uL      RBC 4 74 Million/uL      Hemoglobin 14 6 g/dL      Hematocrit 42 8 %      MCV 90 fL      MCH 30 8 pg      MCHC 34 1 g/dL      RDW 12 5 %      MPV 9 6 fL      Platelets 419 Thousands/uL      nRBC 0 /100 WBCs      Neutrophils Relative 60 %      Immat GRANS % 0 %      Lymphocytes Relative 31 %      Monocytes Relative 5 %      Eosinophils Relative 3 %      Basophils Relative 1 %      Neutrophils Absolute 5 18 Thousands/µL      Immature Grans Absolute 0 01 Thousand/uL      Lymphocytes Absolute 2 72 Thousands/µL      Monocytes Absolute 0 47 Thousand/µL      Eosinophils Absolute 0 27 Thousand/µL      Basophils Absolute 0 07 Thousands/µL                  CT abdomen pelvis w contrast   Final Result by Dale Patino MD (07/06 1129)      1  Two adjacent transition points in the midabdomen with swirling mesentery suggestive of a closed loop obstruction either secondary to internal hernia or adhesion though previously dilated small bowel loops between the transition points are now    decompressed  This is of uncertain etiology  Small bowel loops proximal to the 1st transition point demonstrate stable dilatation  2   Mild worsening of mesenteric edema associated with both dilated and decompressed loops of bowel  This finding is also suggestive of a closed loop obstruction  3   New mild pelvic ascites  I personally discussed this study with Mendy Moya on 7/6/2022 at 11:14 AM                   Workstation performed: [id number]         XR chest 1 view portable   Final Result by Arcadio Boast, MD (07/06 8219)      No acute cardiopulmonary disease  NG tube in stomach with decrease in degree of small bowel dilation  Workstation performed: [id number]         CT abdomen pelvis wo contrast   Final Result by Lobito Méndez MD (07/06 7911)   Exam is limited without IV and oral contrast particularly for soft tissue and bowel evaluation  1   Mild to moderately dilated small bowel loops leading down to the pelvic region  Transition point not definitely seen but there are collapsed small bowel loops leading to the terminal ileum  Findings suspicious for small bowel obstruction  Haziness    of the central mesentery in the pelvic region where there is slight swirling of the mesenteric vessels question underlying internal hernia                 I personally discussed this study with Ghazal Carpenter on 7/6/2022 at 8:30 AM                 Workstation performed: [id number]               Procedures  Procedures      ED Course SBIRT 22yo+    Flowsheet Row Most Recent Value   SBIRT (25 yo +)    In order to provide better care to our patients, we are screening all of our patients for alcohol and drug use  Would it be okay to ask you these screening questions? No Filed at: 07/06/2022 0749                [facility]  Number of Diagnoses or Management Options     Amount and/or Complexity of Data Reviewed  Tests in the radiology section of CPT®: ordered and reviewed  Tests in the medicine section of CPT®: ordered and reviewed  Discuss the patient with other providers: yes      54-year-old female who presents with abdominal pain  Found of have small-bowel obstruction on CT scan  Surgery is consulted patient is admitted on surgery service  Disposition  Final diagnoses:   Small bowel obstruction ([address] )     Time reflects when diagnosis was documented in both MDM as applicable and the Disposition within this note     Time User Action Codes Description Comment    7/6/2022  8:56 AM Janet Valdivia Add [K56 609] Small bowel obstruction [facility])       ED Disposition     None      Follow-up Information     Follow up With Specialties Details Why Contact Info Additional Information    Mary Joya,  Family Medicine Schedule an appointment as soon as possible for a visit in 2 week(s) Please call for an appointment in the next 2 weeks to review your hospital encounter and incidental finding  [address]  [phone]       [address] General Surgery Follow up Please follow up in the [address] [place] [address] as scheduled on 7/21/2022 at 8:30 AM for post-operative re-evaluation  Please call with questions or concerns   [address] [facility] [id number]  [facility], [address] [place] [address], [address] [place] [address]          Discharge Medication List as of 7/7/2022  2:30 PM      START taking these medications    Details   acetaminophen (TYLENOL) 325 mg tablet Take 2 tablets (650 mg total) by mouth every 4 (four) hours as needed for mild pain, Starting Thu 7/7/2022, No Print      methocarbamol (ROBAXIN) 500 mg tablet Take 1 tablet (500 mg total) by mouth every 6 (six) hours, Starting Thu 7/7/2022, Normal      oxyCODONE (ROXICODONE) 5 immediate release tablet Take 0 5-1 tablets (2 5-5 mg total) by mouth every 4 (four) hours as needed for moderate pain or severe pain Max Daily Amount: 30 mg, Starting Thu 7/7/2022, Normal         CONTINUE these medications which have NOT CHANGED    Details   amLODIPine (NORVASC) 5 mg tablet Take 5 mg by mouth daily, Starting Mon 11/29/2021, Historical Med      fluticasone (FLONASE) 50 mcg/act nasal spray 1 spray into each nostril daily, Historical Med           Outpatient Discharge Orders   Discharge Diet     Lifting restrictions     No strenuous exercise     Shower Daily     No driving until     Call provider for:  persistent nausea or vomiting     Call provider for:  severe uncontrolled pain     Call provider for:  redness, tenderness, or signs of infection (pain, swelling, redness, odor or green/yellow discharge around incision site)     Call provider for: active or persistent bleeding     Call provider for:  extreme fatigue     Call provider for:       PDMP Review       Value Time User    PDMP Reviewed  Yes 7/7/2022  2:19 PM Chari Rojo PA-C           ED Provider  Attending physically available and evaluated Shantell Cho  KERRI managed the patient along with the ED Attending      Electronically Signed by         Meera Weeks DO  07/12/22 3313

## 2022-07-06 NOTE — ED ATTENDING ATTESTATION
7/6/2022  I, Federico Mejia MD, saw and evaluated the patient  I have discussed the patient with the resident/non-physician practitioner and agree with the resident's/non-physician practitioner's findings, Plan of Care, and MDM as documented in the resident's/non-physician practitioner's note, except where noted  All available labs and Radiology studies were reviewed  I was present for key portions of any procedure(s) performed by the resident/non-physician practitioner and I was immediately available to provide assistance  At this point I agree with the current assessment done in the Emergency Department  I have conducted an independent evaluation of this patient a history and physical is as follows:    OA: 63 y/o f with h/o HTN who presents with abdominal pain that began this morning and has intensified since onset  Pain is suprapubic and radiates to L flank/lower back  Pain has been progressive since onset  No associated fevers/chills  No cp/sob  No urinary sxms  No lightheadedness/dizziness  Did have two BM this morning however this is abnormal for her  No blood in BM  Denies previous surgeries  PE, NAD but uncomfortable appearing, VSS/afebrile, NC/AT, MMM, anicteric, neck supple/FROM, RR, lungs CTAB, -w/r, abd soft, mildly decreased BS, + diffuse ttp -r/g, - mass, - CVA ttp, - edema, - calf ttp, intact distal pulses and capillary refill < 2 sec, AAO  A/p abd pain acute in onset  eval with labs, imaging  Treat accordingly  ED Course     + early SBO per radiology  Will discuss with surgery       Critical Care Time  Procedures

## 2022-07-06 NOTE — ANESTHESIA POSTPROCEDURE EVALUATION
Post-Op Assessment Note    CV Status:  Stable  Pain Score: 2    Pain management: adequate     Mental Status:  Alert and awake   Hydration Status:  Euvolemic   PONV Controlled:  Controlled   Airway Patency:  Patent      Post Op Vitals Reviewed: Yes      Staff: CRNA         No complications documented      BP   148/68   Temp   97 1   Pulse  72   Resp   16   SpO2   95% RA

## 2022-07-06 NOTE — LETTER
[address] [facility] [address]  [address]  [address] [facility] [address]  Dept: [phone]    July 7, 2022     Patient: Tena Lilly   YOB: 1960   Date of Visit: 7/6/2022       To Whom it May Concern:    Ezekiel Jones is under my professional care  She was seen in the hospital from 7/6/2022   to 07/07/22  She may not return to work until cleared by the Surgery Service  Anticipate at least 6 weeks before clearance for return to work  If you have any questions or concerns, please don't hesitate to call           Sincerely,          Valentin Quevedo PA-C

## 2022-07-07 VITALS
TEMPERATURE: 97.9 F | WEIGHT: 146 LBS | RESPIRATION RATE: 18 BRPM | SYSTOLIC BLOOD PRESSURE: 131 MMHG | HEART RATE: 64 BPM | DIASTOLIC BLOOD PRESSURE: 63 MMHG | OXYGEN SATURATION: 97 % | BODY MASS INDEX: 25.87 KG/M2 | HEIGHT: 63 IN

## 2022-07-07 LAB
ANION GAP SERPL CALCULATED.3IONS-SCNC: 6 MMOL/L (ref 4–13)
BASOPHILS # BLD AUTO: 0.04 THOUSANDS/ΜL (ref 0–0.1)
BASOPHILS NFR BLD AUTO: 0 % (ref 0–1)
BUN SERPL-MCNC: 9 MG/DL (ref 5–25)
CALCIUM SERPL-MCNC: 9.1 MG/DL (ref 8.3–10.1)
CHLORIDE SERPL-SCNC: 108 MMOL/L (ref 100–108)
CO2 SERPL-SCNC: 25 MMOL/L (ref 21–32)
CREAT SERPL-MCNC: 0.69 MG/DL (ref 0.6–1.3)
EOSINOPHIL # BLD AUTO: 0.18 THOUSAND/ΜL (ref 0–0.61)
EOSINOPHIL NFR BLD AUTO: 2 % (ref 0–6)
ERYTHROCYTE [DISTWIDTH] IN BLOOD BY AUTOMATED COUNT: 12.6 % (ref 11.6–15.1)
GFR SERPL CREATININE-BSD FRML MDRD: 94 ML/MIN/1.73SQ M
GLUCOSE SERPL-MCNC: 110 MG/DL (ref 65–140)
HCT VFR BLD AUTO: 42.8 % (ref 34.8–46.1)
HGB BLD-MCNC: 13.9 G/DL (ref 11.5–15.4)
IMM GRANULOCYTES # BLD AUTO: 0.02 THOUSAND/UL (ref 0–0.2)
IMM GRANULOCYTES NFR BLD AUTO: 0 % (ref 0–2)
LYMPHOCYTES # BLD AUTO: 2.05 THOUSANDS/ΜL (ref 0.6–4.47)
LYMPHOCYTES NFR BLD AUTO: 20 % (ref 14–44)
MCH RBC QN AUTO: 30.7 PG (ref 26.8–34.3)
MCHC RBC AUTO-ENTMCNC: 32.5 G/DL (ref 31.4–37.4)
MCV RBC AUTO: 95 FL (ref 82–98)
MONOCYTES # BLD AUTO: 0.6 THOUSAND/ΜL (ref 0.17–1.22)
MONOCYTES NFR BLD AUTO: 6 % (ref 4–12)
NEUTROPHILS # BLD AUTO: 7.2 THOUSANDS/ΜL (ref 1.85–7.62)
NEUTS SEG NFR BLD AUTO: 72 % (ref 43–75)
NRBC BLD AUTO-RTO: 0 /100 WBCS
PLATELET # BLD AUTO: 227 THOUSANDS/UL (ref 149–390)
PMV BLD AUTO: 9.6 FL (ref 8.9–12.7)
POTASSIUM SERPL-SCNC: 3.8 MMOL/L (ref 3.5–5.3)
RBC # BLD AUTO: 4.53 MILLION/UL (ref 3.81–5.12)
SODIUM SERPL-SCNC: 139 MMOL/L (ref 136–145)
WBC # BLD AUTO: 10.09 THOUSAND/UL (ref 4.31–10.16)

## 2022-07-07 PROCEDURE — 85025 COMPLETE CBC W/AUTO DIFF WBC: CPT | Performed by: STUDENT IN AN ORGANIZED HEALTH CARE EDUCATION/TRAINING PROGRAM

## 2022-07-07 PROCEDURE — 80048 BASIC METABOLIC PNL TOTAL CA: CPT | Performed by: STUDENT IN AN ORGANIZED HEALTH CARE EDUCATION/TRAINING PROGRAM

## 2022-07-07 PROCEDURE — NC001 PR NO CHARGE: Performed by: SURGERY

## 2022-07-07 PROCEDURE — 99024 POSTOP FOLLOW-UP VISIT: CPT | Performed by: PHYSICIAN ASSISTANT

## 2022-07-07 RX ORDER — POTASSIUM CHLORIDE 20MEQ/15ML
40 LIQUID (ML) ORAL ONCE
Status: COMPLETED | OUTPATIENT
Start: 2022-07-07 | End: 2022-07-07

## 2022-07-07 RX ORDER — HYDROMORPHONE HCL/PF 1 MG/ML
0.5 SYRINGE (ML) INJECTION EVERY 4 HOURS PRN
Status: DISCONTINUED | OUTPATIENT
Start: 2022-07-07 | End: 2022-07-07 | Stop reason: HOSPADM

## 2022-07-07 RX ORDER — METHOCARBAMOL 500 MG/1
500 TABLET, FILM COATED ORAL EVERY 6 HOURS SCHEDULED
Qty: 40 TABLET | Refills: 0 | Status: SHIPPED | OUTPATIENT
Start: 2022-07-07

## 2022-07-07 RX ORDER — ACETAMINOPHEN 325 MG/1
650 TABLET ORAL EVERY 4 HOURS PRN
Refills: 0
Start: 2022-07-07

## 2022-07-07 RX ORDER — OXYCODONE HYDROCHLORIDE 5 MG/1
2.5-5 TABLET ORAL EVERY 4 HOURS PRN
Qty: 20 TABLET | Refills: 0 | Status: SHIPPED | OUTPATIENT
Start: 2022-07-07

## 2022-07-07 RX ADMIN — ACETAMINOPHEN 975 MG: 325 TABLET, FILM COATED ORAL at 06:22

## 2022-07-07 RX ADMIN — NICOTINE 1 PATCH: 7 PATCH, EXTENDED RELEASE TRANSDERMAL at 08:49

## 2022-07-07 RX ADMIN — METHOCARBAMOL 500 MG: 500 TABLET ORAL at 12:35

## 2022-07-07 RX ADMIN — POTASSIUM CHLORIDE 40 MEQ: 20 SOLUTION ORAL at 12:35

## 2022-07-07 RX ADMIN — METHOCARBAMOL 500 MG: 500 TABLET ORAL at 06:22

## 2022-07-07 RX ADMIN — HEPARIN SODIUM 5000 UNITS: 5000 INJECTION INTRAVENOUS; SUBCUTANEOUS at 06:22

## 2022-07-07 RX ADMIN — GABAPENTIN 100 MG: 100 CAPSULE ORAL at 08:49

## 2022-07-07 RX ADMIN — OXYCODONE HYDROCHLORIDE 5 MG: 5 TABLET ORAL at 01:59

## 2022-07-07 NOTE — ANESTHESIA PROCEDURE NOTES
Peripheral Block    Patient location during procedure: OR  Start time: 7/6/2022 3:15 PM  Reason for block: at surgeon's request and post-op pain management  Staffing  Performed: Anesthesiologist   Anesthesiologist: Venkatesh Zapata MD  Preanesthetic Checklist  Completed: patient identified, IV checked, site marked, risks and benefits discussed, surgical consent, monitors and equipment checked, pre-op evaluation and timeout performed  Peripheral Block  Patient position: supine  Prep: ChloraPrep  Patient monitoring: continuous pulse ox and frequent blood pressure checks  Block type: TAP  Laterality: bilateral  Injection technique: single-shot  Procedures: ultrasound guided, Ultrasound guidance required for the procedure to increase accuracy and safety of medication placement and decrease risk of complications    Ultrasound permanent image saved  Needle  Needle type: Stimuplex   Needle gauge: 22 G  Needle length: 10 cm  Needle localization: ultrasound guidance  Needle insertion depth: 3 cm  Test dose: negative  Assessment  Injection assessment: incremental injection, local visualized surrounding nerve on ultrasound, negative aspiration for heme and negative aspiration for CSF  Paresthesia pain: none  Heart rate change: no  Slow fractionated injection: yes  Post-procedure:  site cleaned  patient tolerated the procedure well with no immediate complications  Additional Notes  10 mL of expareL with 15mL of 0 25% bupivacaine injected bilaterally for a total of 25mL of the mixed solution on the right side and 25mL on the left side

## 2022-07-07 NOTE — UTILIZATION REVIEW
Initial Clinical Review    Admission: Date/Time/Statement:   Admission Orders (From admission, onward)     Ordered        07/06/22 0935  Inpatient Admission  Once                      Orders Placed This Encounter   Procedures    Inpatient Admission     Standing Status:   Standing     Number of Occurrences:   1     Order Specific Question:   Level of Care     Answer:   Med Surg [16]     Order Specific Question:   Estimated length of stay     Answer:   More than 2 Midnights     Order Specific Question:   Certification     Answer:   I certify that inpatient services are medically necessary for this patient for a duration of greater than two midnights  See H&P and MD Progress Notes for additional information about the patient's course of treatment  ED Arrival Information     Expected   -    Arrival   7/6/2022 07:00    Acuity   Urgent            Means of arrival   Walk-In    Escorted by   Self    Service   Surgery-General    Admission type   Urgent            Arrival complaint   abdominal pain           Chief Complaint   Patient presents with    Abdominal Pain     Pt woke up this morning with abdominal pain  Denies NVD  BMx2 this morning, denies trouble urinating       Initial Presentation: 64 y o  female with PMH of HTN presents to [place] ED via personal vehicle with c/o acute onset low abdominal pain after having 2 BMs in the am   Pt burping but not passing flatus  Repeat CT AP revealed close loop versus adhesive bowel obstruction and increased mesenteric edema  Pt with increasing pain and discomfort  Patient urgently to OR  Admit Inpatient med surg d/t SBO:  NPO for OR, IVF, NGT to LCS  7/6 OP Note:  SURGERY DATE: 7/6/2022  Procedure(s) (LRB):  LAPAROTOMY EXPLORATORY, LYSIS OF ADHESIONS (N/A)  Anesthesia Type:   General  Operative Findings:  Single band adhesion in pelvis causing an internal hernia   All viable and healthy bowel     Date: 7/7   Day 2:   S/p exlap, lysis of adhesions, bilateral TAP block 7/6  Ambulating, tolerating CLD without n/v  Not passing gas, no BMs  Voiding spontaneously with appropriate urine output  Pain well controlled  Advance diet to regular diet, dc IVF, encourage inc spir and OOB      ED Triage Vitals [07/06/22 0713]   Temperature Pulse Respirations Blood Pressure SpO2   98 2 °F (36 8 °C) 72 22 (!) 191/84 98 %      Temp Source Heart Rate Source Patient Position - Orthostatic VS BP Location FiO2 (%)   Tympanic Monitor Lying Left arm --      Pain Score       8          Wt Readings from Last 1 Encounters:   07/06/22 66 2 kg (146 lb)     Additional Vital Signs:   Date/Time Temp Pulse Resp BP MAP (mmHg) SpO2 O2 Flow Rate (L/min) O2 Device Cardiac (WDL)   07/07/22 07:31:58 97 9 °F (36 6 °C) 64 -- 131/63 86 97 % -- -- --   07/07/22 02:46:25 98 5 °F (36 9 °C) 70 -- 129/64 86 92 % -- -- --   07/06/22 22:59:35 98 4 °F (36 9 °C) 70 18 130/67 88 95 % -- -- --   07/06/22 21:55:32 98 1 °F (36 7 °C) 70 16 131/67 88 95 % -- -- --   07/06/22 20:30:55 98 °F (36 7 °C) 74 12 135/68 90 95 % -- -- --   07/06/22 19:28:45 97 9 °F (36 6 °C) 72 16 134/76 95 98 % -- -- --   07/06/22 1900 -- 70 15 116/58 75 97 % 2 L/min Nasal cannula --   07/06/22 1830 98 6 °F (37 °C) 74 18 136/72 107 97 % 2 L/min Nasal cannula WDL   07/06/22 1800 -- 68 15 121/72 102 96 % -- -- --   07/06/22 1730 -- 64 16 151/64 93 96 % -- -- --   07/06/22 1700 -- 66 14 148/75 104 97 % -- -- --   07/06/22 1630 -- 60 14 164/69 102 97 % -- -- --   07/06/22 1615 -- 60 14 155/72 106 97 % -- -- --   07/06/22 1600 97 3 °F (36 3 °C) Abnormal  64 18 154/74 97 98 % 2 L/min Nasal cannula --   07/06/22 1545 -- 64 16 148/77 102 92 % -- -- --   07/06/22 1530 -- 66 20 149/71 107 96 % -- -- --   07/06/22 1528 97 2 °F (36 2 °C) Abnormal  67 18 148/68 -- 96 % 6 L/min Simple mask --   07/06/22 1018 -- 63 20 163/72 -- 98 % -- None (Room air) --   07/06/22 0750 -- -- -- -- -- -- -- None (Room air) --   07/06/22 0713 98 2 °F (36 8 °C) 72 22 191/84 Abnormal  -- 98 % -- None (Room air) --       Pertinent Labs/Diagnostic Test Results:   CT abdomen pelvis w contrast   Final Result by Paxton Shah MD (07/06 1129)      1  Two adjacent transition points in the midabdomen with swirling mesentery suggestive of a closed loop obstruction either secondary to internal hernia or adhesion though previously dilated small bowel loops between the transition points are now    decompressed  This is of uncertain etiology  Small bowel loops proximal to the 1st transition point demonstrate stable dilatation  2   Mild worsening of mesenteric edema associated with both dilated and decompressed loops of bowel  This finding is also suggestive of a closed loop obstruction  3   New mild pelvic ascites  XR chest 1 view portable   Final Result by Avelino Jackson MD (07/06 1039)      No acute cardiopulmonary disease  NG tube in stomach with decrease in degree of small bowel dilation  CT abdomen pelvis wo contrast   Final Result by Rayshawn Saravia MD (07/06 8272)   Exam is limited without IV and oral contrast particularly for soft tissue and bowel evaluation  1   Mild to moderately dilated small bowel loops leading down to the pelvic region  Transition point not definitely seen but there are collapsed small bowel loops leading to the terminal ileum  Findings suspicious for small bowel obstruction  Haziness    of the central mesentery in the pelvic region where there is slight swirling of the mesenteric vessels question underlying internal hernia         Results from last 7 days   Lab Units 07/06/22  1209   SARS-COV-2  Negative     Results from last 7 days   Lab Units 07/07/22  0540 07/06/22  0746   WBC Thousand/uL 10 09 8 72   HEMOGLOBIN g/dL 13 9 14 6   HEMATOCRIT % 42 8 42 8   PLATELETS Thousands/uL 227 250   NEUTROS ABS Thousands/µL 7 20 5 18         Results from last 7 days   Lab Units 07/07/22  0540 07/06/22  0746   SODIUM mmol/L 139 134*   POTASSIUM mmol/L 3 8 4 7   CHLORIDE mmol/L 108 107   CO2 mmol/L 25 22   ANION GAP mmol/L 6 5   BUN mg/dL 9 21   CREATININE mg/dL 0 69 0 81   EGFR ml/min/1 73sq m 94 78   CALCIUM mg/dL 9 1 9 7     Results from last 7 days   Lab Units 07/06/22  0746   AST U/L 33   ALT U/L 31   ALK PHOS U/L 84   TOTAL PROTEIN g/dL 7 2   ALBUMIN g/dL 3 6   TOTAL BILIRUBIN mg/dL 0 50     Results from last 7 days   Lab Units 07/06/22  1532   POC GLUCOSE mg/dl 105     Results from last 7 days   Lab Units 07/07/22  0540 07/06/22  0746   GLUCOSE RANDOM mg/dL 110 116     Results from last 7 days   Lab Units 07/06/22  0857   LACTIC ACID mmol/L 1 0     Results from last 7 days   Lab Units 07/06/22  0746   LIPASE u/L 175     Results from last 7 days   Lab Units 07/06/22  0915   CLARITY UA  Clear   COLOR UA  Yellow   SPEC GRAV UA  1 009   PH UA  7 0   GLUCOSE UA mg/dl Negative   KETONES UA mg/dl Trace*   BLOOD UA  Negative   PROTEIN UA mg/dl Negative   NITRITE UA  Negative   BILIRUBIN UA  Negative   UROBILINOGEN UA (BE) mg/dl <2 0   LEUKOCYTES UA  Negative     Results from last 7 days   Lab Units 07/06/22  1209   INFLUENZA A PCR  Negative   INFLUENZA B PCR  Negative   RSV PCR  Negative     ED Treatment:   Medication Administration from 07/06/2022 0700 to 07/06/2022 1316       Date/Time Order Dose Route Action     07/06/2022 0742 HYDROmorphone (DILAUDID) injection 0 5 mg 0 5 mg Intravenous Given     07/06/2022 0744 sodium chloride 0 9 % bolus 1,000 mL 1,000 mL Intravenous New Bag     07/06/2022 1020 lactated ringers infusion 125 mL/hr Intravenous New Bag     07/06/2022 1019 nicotine (NICODERM CQ) 7 mg/24hr TD 24 hr patch 1 patch 1 patch Transdermal Medication Applied     07/06/2022 1025 HYDROmorphone HCl (DILAUDID) injection 0 2 mg 0 2 mg Intravenous Given     07/06/2022 1209 phenol (CHLORASEPTIC) 1 4 % mucosal liquid 1 spray 1 spray Mouth/Throat Given     07/06/2022 1019 phenol (CHLORASEPTIC) 1 4 % mucosal liquid 1 spray 1 spray Mouth/Throat Given     07/06/2022 1047 iohexol (OMNIPAQUE) 350 MG/ML injection (MULTI-DOSE) 65 mL 65 mL Intravenous Given        Past Medical History:   Diagnosis Date    Hypertension      Present on Admission:  **None**      Admitting Diagnosis: Small bowel obstruction (HCC) [K56 609]  Abdominal pain [R10 9]  Age/Sex: 64 y o  female  Admission Orders:  Scheduled Medications:  acetaminophen, 975 mg, Oral, Q8H [address]  gabapentin, 100 mg, Oral, TID  heparin (porcine), 5,000 Units, Subcutaneous, Q8H ANGIE  methocarbamol, 500 mg, Oral, Q6H [address]  nicotine, 1 patch, Transdermal, Daily      Continuous IV Infusions:  lactated ringers infusion  Rate: 100 mL/hr Dose: 100 mL/hr  Freq: Continuous Route: IV  Last Dose: Stopped (07/07/22 8185)  Start: 07/06/22 0945 End: 07/07/22 5159    metroNIDAZOLE (FLAGYL) IVPB (premix)  Freq: Continuous PRN Route: IV  Last Dose: Stopped (07/06/22 1502)  Start: 07/06/22 1422 End: 07/06/22 1526     PRN Meds:  HYDROmorphone, 0 5 mg, Intravenous, Q3H PRN x1 thus far  labetalol, 10 mg, Intravenous, Q4H PRN  ondansetron, 4 mg, Intravenous, Q6H PRN x2 thus far  oxyCODONE, 10 mg, Oral, Q4H PRN  oxyCODONE, 5 mg, Oral, Q4H PRN x2 thus far    scd  IO    IP CONSULT TO ACUTE PAIN SERVICE    Network Utilization Review Department  ATTENTION: Please call with any questions or concerns to [phone] and carefully listen to the prompts so that you are directed to the right person  All voicemails are confidential   Terry Gomez all requests for admission clinical reviews, approved or denied determinations and any other requests to dedicated fax number below belonging to the campus where the patient is receiving treatment   List of dedicated fax numbers for the Facilities:  [address] DENIALS (Administrative/Medical Necessity) [phone]   [address] (Maternity/NICU/Pediatrics) [phone] [address] [phone] [address] [phone]   [address] [place] [address] Ana Rodriguez [facility] [phone]   [address] [address] [place] [address] Jessica Larson 1481 [address] [place] [address] [phone]

## 2022-07-07 NOTE — PROGRESS NOTES
Progress Note - General Surgery   Natividad Butler 64 y o  female MRN: [id number]  Unit/Bed#: [facility] 830-01 Encounter: [id number]    Assessment:  61F with SBO s/p exlap, lysis of adhesions, bilateral TAP block 7/6    Plan:  - advance diet to regular diet  - DC fluids  - ICS, OOB  - possible discharge today    Subjective/Objective   Subjective:   [place] Litten  Ambulating, tolerating CLD without n/v  Not passing gas, no BMs  Voiding spontaneously with appropriate urine output  Pain well controlled  Objective:     Blood pressure 129/64, pulse 70, temperature 98 5 °F (36 9 °C), resp  rate 18, height 5' 3" (1 6 m), weight 66 2 kg (146 lb), SpO2 92 %  ,Body mass index is 25 86 kg/m²        Intake/Output Summary (Last 24 hours) at 7/7/2022 0610  Last data filed at 7/7/2022 0101  Gross per 24 hour   Intake 8133 33 ml   Output 1625 ml   Net 6508 33 ml       Invasive Devices  Report    Peripheral Intravenous Line  Duration           Peripheral IV 07/06/22 Left;Ventral (anterior) Forearm <1 day    Peripheral IV 07/06/22 Right Forearm <1 day                Physical Exam:  General: NAD  Neuro: A&Ox3  HEENT: Normocephalic, atraumatic, moist mucus membranes  CV: regular rate  Lung: normal work of breathing  Abd: soft, non-distended, midline incision c/d/i with clean dressing, appropriately tender, more tender in the R > L abdomen  MSK: normal ROM      Solange Jon MD  General Surgery PGY1

## 2022-07-07 NOTE — DISCHARGE SUMMARY
Discharge Summary - General Surgery   Zo Avila 64 y o  female MRN: [id number]  Unit/Bed#: [facility] 830-01 Encounter: [id number]    Admission Date: 7/6/2022     Discharge Date: 7/7/2022    Admitting Diagnosis: Small bowel obstruction ([address] ) [K56 609]  Abdominal pain [R10 9]    Discharge Diagnosis:  Small bowel obstruction  Attending and Service: Dr Pati Waldrop, Acute Care Surgical Services  Consulting Physician(s):  None  Imaging and Procedures Performed:     CT abdomen pelvis wo contrast    Result Date: 7/6/2022  Impression: Exam is limited without IV and oral contrast particularly for soft tissue and bowel evaluation  1   Mild to moderately dilated small bowel loops leading down to the pelvic region  Transition point not definitely seen but there are collapsed small bowel loops leading to the terminal ileum  Findings suspicious for small bowel obstruction  Haziness  of the central mesentery in the pelvic region where there is slight swirling of the mesenteric vessels question underlying internal hernia  I personally discussed this study with Matias Kessler on 7/6/2022 at 8:30 AM   Workstation performed: [id number]     XR chest 1 view portable    Result Date: 7/6/2022  Impression: No acute cardiopulmonary disease  NG tube in stomach with decrease in degree of small bowel dilation  Workstation performed: [id number]     CT abdomen pelvis w contrast    Result Date: 7/6/2022  Impression: 1  Two adjacent transition points in the midabdomen with swirling mesentery suggestive of a closed loop obstruction either secondary to internal hernia or adhesion though previously dilated small bowel loops between the transition points are now decompressed  This is of uncertain etiology  Small bowel loops proximal to the 1st transition point demonstrate stable dilatation  2   Mild worsening of mesenteric edema associated with both dilated and decompressed loops of bowel    This finding is also suggestive of a closed loop obstruction  3   New mild pelvic ascites  I personally discussed this study with Lea Romero on 7/6/2022 at 11:14 AM  Workstation performed: [id number]       Exploratory laparotomy, lysis of adhesions and bilateral tap block on 07/06/2022  Hospital Course: Robert Myles is a 28-year-old female who presented to the emergency department with acute onset abdominal pain  She noted that she had 2 bowel movements earlier in the day before developing acute onset pain throughout her lower abdomen  She denied associated nausea vomiting, fever or chills  She noted a prior surgical history of tubal ligation approximately 30 years ago as well as hysteroscopy with endometrial ablation  In addition to her pain, she noted frequent belching and that she was not passing any flatus  Her ED workup indicated a small bowel obstruction and the surgery service consulted  On her initial surgical evaluation, she was hypertensive with normal vital signs otherwise; her abdomen was soft and nondistended, but was tender in the infraumbilical and left lower quadrant areas without peritonitis; the remainder of her exam was unremarkable  Her initial workup included labs and the above-noted imaging studies  She was admitted to the acute care surgery service with a small bowel obstruction and concern for internal hernia  She was kept NPO, started on IV fluids for resuscitation, and NG tube was placed for bowel decompression, she was placed on analgesic regimen, and ultimately operative intervention was recommended  She agreed to surgery and went on to have an exploratory laparotomy, lysis of adhesions and bilateral tap block on 07/06/2022  She did well during her operative intervention with no immediate complications    During her postoperative course, she had quick return of bowel function and resolution of her symptoms, she was able to tolerate an oral diet without any further nausea or vomiting, and she had adequate pain control with oral medications by 07/07/2022  She was deemed stable for discharge on 07/07/2022  For further details of her hospital encounter, please see her complete medical records  On discharge, the patient is instructed to follow-up with the patient's primary care provider within the next 2 weeks to review the events of the recent hospitalization  The patient is instructed to follow-up with the Acute Care Surgical Services as scheduled on 07/18/2022 at 8:30 AM  The patient is instructed to follow the provided discharge instructions  Condition at Discharge: good     Discharge instructions/Information to patient and family:   See after visit summary for information provided to patient and family  Provisions for Follow-Up Care:  See after visit summary for information related to follow-up care and any pertinent home health orders  Disposition: See After Visit Summary for discharge disposition information  Planned Readmission: No    Discharge Statement   I spent 30 minutes discharging the patient  This time was spent on the day of discharge  I had direct contact with the patient on the day of discharge  Additional documentation is required if more than 30 minutes were spent on discharge  Discharge Medications:  See after visit summary for reconciled discharge medications provided to patient and family      Porter Merlos PA-C  7/7/2022  02:11 PM

## 2022-07-07 NOTE — CONSULTS
Consult Note- Acute Pain Service   Martha Stern 64 y o  female MRN: [id number]  Unit/Bed#: [facility] 830-01 Encounter: [id number]               Assessment/Plan     Assessment:   Patient Active Problem List   Diagnosis    Bilateral hip pain    Hypertension    SBO (small bowel obstruction) (HCC)      Martha Stern is a 64 y o  female with a PMHx of HTN who originally presented with a SBO s/p ex-lap/lysis of adhesions on 7/6  Intraoperative bilateral TAP blocks with exparel were done with good analgesic effect  APS consulted for post-operative pain management  Upon bedside evaluation today, Bryanna Somers was doing quite well  Pain adequately controlled on current MMA  Reports that she is making good urine and passing gas  Denies opioid-induced side effects including nausea/vomiting/itching/constipation  Tolerating CLD without nausea/vomiting  Able to get OOB and walk the halls with assistance  Plan:   - Continue MMA: PO tylenol 975mg q8, PO gabapentin 100mg TID, robaxin 500mg q6, PO oxycodone 5/10mg q4hr PRN for moderate-severe pain  - Wean IV dilaudid to 0 5mg q4hr PRN for breakthrough  - Consider NSAIDs if pain worsens    - Bowel regimen when appropriate from surgical perspective    APS will sign off at this time  Thank you for the consult  All opioids and other analgesics to be written at discretion of primary team  Please contact Acute Pain Service - [facility] via [employer] from [phone] with additional questions or concerns  See Elijah or Tiffany for additional contacts and after hours information  History of Present Illness    Admit Date:  7/6/2022  Hospital Day:  1 day  Primary Service:  Surgery-General  Attending Provider:  Ilir Zepeda DO  Reason for Consult / Principal Problem: Post-operative abdominal pain  HPI: Martha Stern is a 64 y o  female who presents with post-operative abdominal pain in setting of recent ex-lap/lysis of adhesions on 7/6   Received intraop- TAPb locks with exparel for post-operative analgesia  Current pain location(s): Abdomen  Pain Scale:   5/10  Quality: Diffuse, achy  Current Analgesic regimen:  See above    Pain History: N/A  Pain Management Provider:  N/A    I have reviewed the patient's controlled substance dispensing history in the Prescription Drug Monitoring Program in compliance with the [facility] regulations before prescribing any controlled substances  Inpatient consult to Acute Pain Service  Consult performed by: Chris Tran MD  Consult ordered by: Melva Hall MD          Review of Systems   Constitutional: Negative  HENT: Negative  Respiratory: Negative  Cardiovascular: Negative  Gastrointestinal: Positive for abdominal pain  Genitourinary: Negative  Musculoskeletal: Negative  Skin: Negative  Neurological: Negative  Psychiatric/Behavioral: Negative          Historical Information   Past Medical History:   Diagnosis Date    Hypertension      Past Surgical History:   Procedure Laterality Date    EXPLORATORY LAPAROTOMY W/ BOWEL RESECTION N/A 7/6/2022    Procedure: LAPAROTOMY EXPLORATORY, LYSIS OF ADHESIONS;  Surgeon: Viky Olivo DO;  Location: [facility];  Service: General     Social History   Social History     Substance and Sexual Activity   Alcohol Use Not Currently     Social History     Substance and Sexual Activity   Drug Use Not Currently     Social History     Tobacco Use   Smoking Status Current Every Day Smoker   Smokeless Tobacco Never Used     Family History: non-contributory    Meds/Allergies   all current active meds have been reviewed    No Known Allergies    Objective   Temp:  [97 2 °F (36 2 °C)-98 6 °F (37 °C)] 97 9 °F (36 6 °C)  HR:  [60-74] 64  Resp:  [12-20] 18  BP: (116-164)/(58-77) 131/63    Intake/Output Summary (Last 24 hours) at 7/7/2022 1201  Last data filed at 7/7/2022 0900  Gross per 24 hour   Intake 8795 ml   Output 1925 ml   Net 6870 ml       Physical Exam  Constitutional:       Appearance: Normal appearance  HENT:      Head: Normocephalic  Mouth/Throat:      Mouth: Mucous membranes are dry  Eyes:      Pupils: Pupils are equal, round, and reactive to light  Cardiovascular:      Rate and Rhythm: Normal rate and regular rhythm  Pulses: Normal pulses  Pulmonary:      Effort: Pulmonary effort is normal    Abdominal:      General: Abdomen is flat  Palpations: Abdomen is soft  Musculoskeletal:         General: Normal range of motion  Skin:     General: Skin is dry  Neurological:      General: No focal deficit present  Mental Status: She is alert and oriented to person, place, and time  Psychiatric:         Mood and Affect: Mood normal          Lab Results: I have personally reviewed pertinent labs  Imaging Studies: I have personally reviewed pertinent reports  EKG, Pathology, and Other Studies: I have personally reviewed pertinent reports  Counseling / Coordination of Care  Total floor / unit time spent today Level 1 = 20 minutes  Greater than 50% of total time was spent with the patient and / or family counseling and / or coordination of care  Please note that the APS provides consultative services regarding pain management only  With the exception of ketamine and epidural infusions and except when indicated, final decisions regarding starting or changing doses of analgesic medications are at the discretion of the consulting service  Off hours consultation and/or medication management is generally not available      Awilda Garcia MD  Acute Pain Service

## 2022-07-07 NOTE — RESTORATIVE TECHNICIAN NOTE
Restorative Technician Note      Patient Name: Robert Myles     Restorative Tech Visit Date: 7/7/2022  Note Type: Mobility  Patient Position Upon Consult: Standing  Activity Performed: Ambulated  Assistive Device: Other (Comment) (HR as needed)  Education Provided: Yes; Family or social support of family present for education by provider; Other (comment) (Pt educated to walk throughout the day 4-5x  Pt states no questions or concerns at this time )  Patient Position at End of Consult: Supine;  All needs within reach    Jasiel Hollingsworth  DPT, Restorative Technician

## 2022-07-07 NOTE — RESTORATIVE TECHNICIAN NOTE
Restorative Technician Note      Patient Name: Lillian Apgar     Restorative Tech Visit Date: 2022  Note Type: Mobility  Patient Position Upon Consult: Supine  Activity Performed: Ambulated  Assistive Device: Other (Comment) (no AD; HR as needed)  Education Provided: Yes; Family or social support of family present for education by provider; Other (comment) (Pt educated to walk throughout the day 4-5x  Pt states no questions or concerns at this time )  Patient Position at End of Consult: Standing; All needs within reach;  Other (comment) (Continues to ambulate with )    Benton Slaughter  DPT, Restorative Technician

## 2022-07-07 NOTE — CASE MANAGEMENT
Case Management Assessment & Discharge Planning Note    Patient name Mary Caicedo  [id number] MRN [id number]  : 1960 Date 2022       Current Admission Date: 2022  Current Admission Diagnosis:SBO (small bowel obstruction) [facility])   Patient Active Problem List    Diagnosis Date Noted    SBO (small bowel obstruction) ([address] ) 2022    Hypertension     Bilateral hip pain 2021      LOS (days): 1  Geometric Mean LOS (GMLOS) (days):   Days to GMLOS:     OBJECTIVE:    Risk of Unplanned Readmission Score: 6 56         Current admission status: Inpatient       Preferred Pharmacy:   [address] [facility], [address]  [facility]  [address]  [facility] [place]  Phone: [phone] Fax: [phone]    Primary Care Provider: Anabel Woodard DO    Primary Insurance: BLUE CROSS  Secondary Insurance:     ASSESSMENT:  [address] Proxies    There are no active Health Care Proxies on file  Advance Directives  Does patient have a [address] [place] [address] Avenue?: No  Does patient have Advance Directives?: No              Patient Information  Admitted from[de-identified] Home  Mental Status: Alert  During Assessment patient was accompanied by: Not accompanied during assessment  Assessment information provided by[de-identified] Patient  Primary Caregiver: Self  Support Systems: Spouse/significant other, Family members  Home entry access options   Select all that apply : Stairs  Number of steps to enter home : 2  Do the steps have railings?: Yes  Type of Current Residence: [address] [place] [address] home  Upon entering residence, is there a bedroom on the main floor (no further steps)?: No  A bedroom is located on the following floor levels of residence (select all that apply):: 2nd Floor  Upon entering residence, is there a bathroom on the main floor (no further steps)?: Yes  Number of steps to 2nd floor from main floor: One Flight  In the last 12 months, was there a time when you were not able to pay the mortgage or rent on time?: No  In the last 12 months, was there a time when you did not have a steady place to sleep or slept in a shelter (including now)?: No  Homeless/housing insecurity resource given?: N/A  Living Arrangements: Lives w/ Spouse/significant other    Activities of Daily Living Prior to Admission  Functional Status: Independent  Completes ADLs independently?: Yes  Ambulates independently?: Yes  Does patient use assisted devices?: No  Does patient currently own DME?: No  Does patient have a history of Outpatient Therapy (PT/OT)?: No  Does the patient have a history of Short-Term Rehab?: No  Does patient have a history of HHC?: No         Patient Information Continued  Income Source: Employed  Does patient have prescription coverage?: Yes  Within the past 12 months, you worried that your food would run out before you got the money to buy more : Never true  Within the past 12 months, the food you bought just didn't last and you didn't have money to get more : Never true  Food insecurity resource given?: N/A  Does patient receive dialysis treatments?: No  Does patient have a history of substance abuse?: No  Does patient have a history of Mental Health Diagnosis?: No         Means of Transportation  Means of Transport to Appts[de-identified] Drives Self  In the past 12 months, has lack of transportation kept you from medical appointments or from getting medications?: No  In the past 12 months, has lack of transportation kept you from meetings, work, or from getting things needed for daily living?: No  Was application for public transport provided?: N/A        DISCHARGE DETAILS:    Discharge planning discussed with[de-identified] patient  Freedom of Choice: Yes                   Contacts  Patient Contacts:  Marvin De Jesus  Relationship to Patient[de-identified] Family  Contact Method: Phone  Phone Number: [phone]  Reason/Outcome: Continuity of Care, Emergency Contact, Discharge Planning                        Treatment Team Recommendation: Home     CM reviewed d/c planning process including the following: identifying help at home, patient preference for d/c planning needs, Discharge Lounge, [facility] Meds to Bed program, availability of treatment team to discuss questions or concerns patient and/or family may have regarding understanding medications and recognizing signs and symptoms once discharged  CM also encouraged patient to follow up with all recommended appointments after discharge  Patient advised of importance for patient and family to participate in managing patients medical well being  Patient/caregiver received discharge checklist   Content reviewed  Patient/caregiver encouraged to participate in discharge plan of care prior to discharge home

## 2022-07-07 NOTE — QUICK NOTE
Nurse-Patient-Provider rounds were completed with the patient's nurse today, Bakari Lim  We discussed the plan is to continue trial of regular diet  Maintain saline locked IV for now  Transition to oral medication regimen for pain; patient reports pain control is adequate at this time  Encourage increased activity and ambulation in the halls  Patient indicates she is beginning to pass flatus and is feeling pretty well  We discussed that we will re-evaluate her after lunch and if she continues to tolerate oral diet with adequate pain control with increased activity and she continues with bowel function, she will likely be safe for discharge later today  We reviewed all of the invasive devices/lines/telemetry orders   - None  DVT Prophylaxis:  - Subcutaneous heparin and SCDs  Pain Assessment / Plan:  - Continue current analgesic regimen  Mobility Assessment / Plan:  - Activity as tolerated  Goals / Barriers for discharge:  - Anticipate possible discharge in the next 24 hours  - Case management following; case and discharge needs discussed  All questions and concerns were addressed  I spent greater than 20 minutes reviewing the plan with the patient and the nurse, and coordinating care for the day      Siomara Rice PA-C  7/7/2022 10:04 AM

## 2022-07-07 NOTE — INCIDENTAL FINDINGS
The following findings require follow up:  Radiographic finding     Finding: One or more subcentimeter sharply circumscribed low-density hepatic lesion(s) are noted, too small to accurately characterize, but statistically most likely to represent subcentimeter hepatic cysts  No suspicious solid hepatic lesion is identified  Follow up required: No further work up or intervention necessary at this time  A malignancy (cancer) cannot be excluded based on this imaging  Recommend outpatient follow up with you PCP to review the fidnging and for further work up or surveillance as indicated       Follow up should be done within 2 week(s)    Please notify the following clinician to assist with the follow up:   Dr Alejandro Morfin

## 2022-07-07 NOTE — DISCHARGE INSTRUCTIONS
Acute Care Surgery Discharge Instructions    Please follow-up as instructed  Activity:  - No lifting greater than 20 pounds or strenuous physical activity or exercise for 6 weeks  - Walking and normal light activities are encouraged  - Normal daily activities including climbing steps are okay  - No driving until no longer using pain medications  Return to work:    - You may return to work once cleared by the Surgical Service  Diet:    - You may resume your normal diet  Wound Care:  - May shower daily  No tub baths or swimming until cleared by your surgeon   - Wash incision gently with soap and water and pat dry  - Do not apply any creams or ointments unless instructed to do so by your surgeon   - Fabi Davis may apply ice as needed (no longer than 20 minutes at a time) for the first 48 hours  - Bruising is not unusual and will go away with a little time  You may apply a warm, moist compress that may help the bruising resolve quicker  - You may remove the dressings the day after surgery (unless otherwise instructed)  Leave any skin tapes (steri-strips) on the incision(s) in place until they fall off on their own  Any new dressings are optional     Medications:    - You may resume all of your regular medications after discharge unless otherwise instructed  Please refer to your discharge medication list for further details  - Please take the pain medications as directed  - You are encouraged to use non-narcotic pain medications first and whenever possible  Reserve the use of narcotic pain medication for moderate to severe pain not controlled by non-narcotic medications   - No driving while taking narcotic pain medications  - You may become constipated, especially if taking pain medications  You may take any over the counter stool softeners or laxatives as needed  Examples: Milk of Magnesia, Colace, Senna      Additional Instructions:  - If you have any questions or concerns after discharge please call the office   - Call office or return to ER if fever greater than 101, chills, persistent nausea/vomiting, worsening/uncontrollable pain, and/or increasing redness or purulent/foul smelling drainage from incision(s)

## 2022-07-08 NOTE — UTILIZATION REVIEW
Notification of Discharge   This is a Notification of Discharge from our facility [address]  Please be advised that this patient has been discharge from our facility  Below you will find the admission and discharge date and time including the patients disposition  UTILIZATION REVIEW CONTACT:  Yuriy Ca  Utilization   Network Utilization Review Department  Phone: [phone] x carefully listen to the prompts  All voicemails are confidential   Email: [email or web address]     PHYSICIAN ADVISORY SERVICES:  FOR [id number] REVIEW - MEDICAL NECESSITY DENIAL  Phone: [phone]  Fax: [phone]  Email: [email or web address]     PRESENTATION DATE: 7/6/2022  7:02 AM  OBERVATION ADMISSION DATE:   INPATIENT ADMISSION DATE: 7/6/22  9:35 AM   DISCHARGE DATE: 7/7/2022  3:35 PM  DISPOSITION: Home/Self Care Home/Self Care      IMPORTANT INFORMATION:  Send all requests for admission clinical reviews, approved or denied determinations and any other requests to dedicated fax number below belonging to the campus where the patient is receiving treatment   List of dedicated fax numbers:  [address] DENIALS (Administrative/Medical Necessity) [phone]   [address] (Maternity/NICU/Pediatrics) [phone]   [facility] [phone]   [address] [facility] [address] [phone]   [facility] [place]  [phone]   [address] [facility] [address] [place] [address],[address] Floor [address] [place] [address] [phone]   [facility]  [phone]   [address] [facility]  [address] [facility] [address] [facility] [phone]

## 2022-07-18 ENCOUNTER — OFFICE VISIT (OUTPATIENT)
Dept: SURGERY | Facility: CLINIC | Age: 62
End: 2022-07-18

## 2022-07-18 VITALS — WEIGHT: 140.6 LBS | TEMPERATURE: 98.4 F | HEIGHT: 63 IN | BODY MASS INDEX: 24.91 KG/M2

## 2022-07-18 DIAGNOSIS — Z09 POSTOP CHECK: Primary | ICD-10-CM

## 2022-07-18 PROBLEM — K56.609 SBO (SMALL BOWEL OBSTRUCTION) (HCC): Status: RESOLVED | Noted: 2022-07-06 | Resolved: 2022-07-06

## 2022-07-18 PROCEDURE — 99024 POSTOP FOLLOW-UP VISIT: CPT | Performed by: STUDENT IN AN ORGANIZED HEALTH CARE EDUCATION/TRAINING PROGRAM

## 2022-07-18 NOTE — PROGRESS NOTES
Assessment/Plan:  Recovering well post-operatively  Staples removed today  Follow up as needed  Subjective:      Patient ID: Lin Beltran is a 64 y o  female  Patient underwent exploratory laparotomy with lysis of adhesions on 7/6/22 after presenting with a SBO  Since her operation she has resumed her normal activities, is eating and drinking well, urinating, passing flatus, having regular BMs, and has had no issues with her incision  She denies fever, chills, nausea, vomiting, or diarrhea  The following portions of the patient's history were reviewed and updated as appropriate: allergies, current medications, past family history, past medical history, past social history, past surgical history and problem list     Review of Systems   Constitutional: Negative for activity change, appetite change, chills, fatigue and fever  Respiratory: Negative for shortness of breath  Cardiovascular: Negative for chest pain  Gastrointestinal: Negative for abdominal distention, abdominal pain, blood in stool, constipation, diarrhea, nausea and vomiting  All other systems reviewed and are negative  Objective:    Temp 98 4 °F (36 9 °C) (Core)   Ht 5' 3" (1 6 m)   Wt 63 8 kg (140 lb 9 6 oz)   BMI 24 91 kg/m²      Physical Exam  Vitals reviewed  Constitutional:       General: She is not in acute distress  Appearance: Normal appearance  Eyes:      Extraocular Movements: Extraocular movements intact  Cardiovascular:      Rate and Rhythm: Normal rate and regular rhythm  Pulmonary:      Effort: Pulmonary effort is normal    Abdominal:      General: Abdomen is flat  There is no distension  Palpations: Abdomen is soft  There is no mass  Tenderness: There is no abdominal tenderness  Comments: Midline incision healing well, staples in place, no redness or drainage   Musculoskeletal:      Cervical back: Normal range of motion  Neurological:      Mental Status: She is alert

## 2022-12-19 ENCOUNTER — ANESTHESIA (OUTPATIENT)
Dept: GASTROENTEROLOGY | Facility: HOSPITAL | Age: 62
End: 2022-12-19

## 2022-12-19 ENCOUNTER — HOSPITAL ENCOUNTER (OUTPATIENT)
Dept: GASTROENTEROLOGY | Facility: HOSPITAL | Age: 62
Setting detail: OUTPATIENT SURGERY
Discharge: HOME | End: 2022-12-19
Attending: COLON & RECTAL SURGERY

## 2022-12-19 ENCOUNTER — ANESTHESIA EVENT (OUTPATIENT)
Dept: GASTROENTEROLOGY | Facility: HOSPITAL | Age: 62
End: 2022-12-19

## 2022-12-19 VITALS
HEIGHT: 63 IN | TEMPERATURE: 97 F | DIASTOLIC BLOOD PRESSURE: 56 MMHG | BODY MASS INDEX: 24.63 KG/M2 | WEIGHT: 139 LBS | SYSTOLIC BLOOD PRESSURE: 93 MMHG | OXYGEN SATURATION: 99 % | RESPIRATION RATE: 18 BRPM | HEART RATE: 71 BPM

## 2022-12-19 DIAGNOSIS — Z86.010 PERSONAL HISTORY OF COLONIC POLYPS: ICD-10-CM

## 2022-12-19 RX ORDER — SODIUM CHLORIDE 9 MG/ML
INJECTION, SOLUTION INTRAVENOUS CONTINUOUS PRN
Status: DISCONTINUED | OUTPATIENT
Start: 2022-12-19 | End: 2022-12-19

## 2022-12-19 RX ORDER — PROPOFOL 10 MG/ML
INJECTION, EMULSION INTRAVENOUS CONTINUOUS PRN
Status: DISCONTINUED | OUTPATIENT
Start: 2022-12-19 | End: 2022-12-19

## 2022-12-19 RX ORDER — PROPOFOL 10 MG/ML
INJECTION, EMULSION INTRAVENOUS AS NEEDED
Status: DISCONTINUED | OUTPATIENT
Start: 2022-12-19 | End: 2022-12-19

## 2022-12-19 RX ADMIN — SODIUM CHLORIDE: 0.9 INJECTION, SOLUTION INTRAVENOUS at 10:58

## 2022-12-19 RX ADMIN — PROPOFOL 120 MG: 10 INJECTION, EMULSION INTRAVENOUS at 11:03

## 2022-12-19 RX ADMIN — PROPOFOL 150 MCG/KG/MIN: 10 INJECTION, EMULSION INTRAVENOUS at 11:03

## 2022-12-19 NOTE — ANESTHESIA PREPROCEDURE EVALUATION
Procedure:  COLONOSCOPY    Relevant Problems   CARDIO   (+) Hypertension        Physical Exam    Airway    Mallampati score: I  TM Distance: >3 FB  Neck ROM: full     Dental       Cardiovascular  Cardiovascular exam normal    Pulmonary  Pulmonary exam normal     Other Findings        Anesthesia Plan  ASA Score- 2     Anesthesia Type- IV sedation with anesthesia with ASA Monitors  Additional Monitors:   Airway Plan:           Plan Factors-Exercise tolerance (METS): >4 METS  Chart reviewed  Existing labs reviewed  Patient summary reviewed  Patient is not a current smoker  Induction- intravenous  Postoperative Plan-     Informed Consent- Anesthetic plan and risks discussed with patient  I personally reviewed this patient with the CRNA  Discussed and agreed on the Anesthesia Plan with the NAZ Boyce

## 2022-12-19 NOTE — ANESTHESIA POSTPROCEDURE EVALUATION
Post-Op Assessment Note    CV Status:  Stable    Pain management: adequate     Mental Status:  Alert and awake   Hydration Status:  Euvolemic   PONV Controlled:  Controlled   Airway Patency:  Patent      Post Op Vitals Reviewed: Yes            No notable events documented      BP (!) 85/49 (12/19/22 1120)    Temp (!) 97 °F (36 1 °C) (12/19/22 1120)    Pulse 84 (12/19/22 1120)   Resp 18 (12/19/22 1120)    SpO2 98 % (12/19/22 1120)

## 2022-12-19 NOTE — H&P
History and Physical   Colon and Rectal Surgery   Daniel Avila 58 y o  female MRN: [id number]  Unit/Bed#:  Encounter: [id number]  12/19/22   @NOW    No chief complaint on file  History of Present Illness   HPI:  Daniel Avila is a 58 y o  female who presents for surveillance colonoscopy for personal history of polyps      Historical Information   Past Medical History:   Diagnosis Date   • Hypertension      Past Surgical History:   Procedure Laterality Date   • EXPLORATORY LAPAROTOMY W/ BOWEL RESECTION N/A 7/6/2022    Procedure: LAPAROTOMY EXPLORATORY, LYSIS OF ADHESIONS;  Surgeon: Liyah Gallegos DO;  [facility];  Service: General       Meds/Allergies     (Not in a hospital admission)        Current Outpatient Medications:   •  acetaminophen (TYLENOL) 325 mg tablet, Take 2 tablets (650 mg total) by mouth every 4 (four) hours as needed for mild pain, Disp: , Rfl: 0  •  amLODIPine (NORVASC) 5 mg tablet, Take 5 mg by mouth daily, Disp: , Rfl:   •  fluticasone (FLONASE) 50 mcg/act nasal spray, 1 spray into each nostril daily, Disp: , Rfl:   •  methocarbamol (ROBAXIN) 500 mg tablet, Take 1 tablet (500 mg total) by mouth every 6 (six) hours, Disp: 40 tablet, Rfl: 0  •  oxyCODONE (ROXICODONE) 5 immediate release tablet, Take 0 5-1 tablets (2 5-5 mg total) by mouth every 4 (four) hours as needed for moderate pain or severe pain Max Daily Amount: 30 mg (Patient not taking: Reported on 7/18/2022), Disp: 20 tablet, Rfl: 0    No Known Allergies      Social History   Social History     Substance and Sexual Activity   Alcohol Use Not Currently     Social History     Substance and Sexual Activity   Drug Use Not Currently     Social History     Tobacco Use   Smoking Status Every Day   Smokeless Tobacco Never         Family History: No family history on file        Objective     Current Vitals:      No intake or output data in the 24 hours ending 12/19/22 0939    Physical Exam:  General:  Resting comfortably in bed   Eyes:Sclera anicteric  ENT: Trachea midline  Pulm:  Symmetric chest raise  No respiratory Distress  CV:  Regular on monitor  Abdomen:  Soft NT ND  Extremities:  No clubbing/ cyanosis/ edema    Lab Results: I have personally reviewed pertinent lab results  Imaging: I have personally reviewed pertinent reports  ASSESSMENT:  Brian Romero is a 58 y o  female who presents for outpatient colonoscopy  PLAN:  For colonoscopy    Risks/ Benefits reviewed to include but not limited to anesthesia, bleeding, missed lesions, and colonoscopic perforation requiring surgery

## 2023-12-01 ENCOUNTER — APPOINTMENT (OUTPATIENT)
Dept: URGENT CARE | Facility: MEDICAL CENTER | Age: 63
End: 2023-12-01

## 2023-12-14 ENCOUNTER — HOSPITAL ENCOUNTER (OUTPATIENT)
Dept: RADIOLOGY | Age: 63
Discharge: HOME | End: 2023-12-14
Payer: COMMERCIAL

## 2023-12-14 DIAGNOSIS — Z12.31 ENCOUNTER FOR SCREENING MAMMOGRAM FOR MALIGNANT NEOPLASM OF BREAST: ICD-10-CM

## 2023-12-14 PROCEDURE — 77063 BREAST TOMOSYNTHESIS BI: CPT

## 2023-12-14 PROCEDURE — 77067 SCR MAMMO BI INCL CAD: CPT

## 2024-07-02 ENCOUNTER — OFFICE VISIT (OUTPATIENT)
Dept: OBGYN CLINIC | Facility: CLINIC | Age: 64
End: 2024-07-02
Payer: COMMERCIAL

## 2024-07-02 ENCOUNTER — APPOINTMENT (OUTPATIENT)
Dept: RADIOLOGY | Age: 64
End: 2024-07-02
Payer: COMMERCIAL

## 2024-07-02 VITALS
WEIGHT: 142 LBS | HEART RATE: 80 BPM | SYSTOLIC BLOOD PRESSURE: 145 MMHG | DIASTOLIC BLOOD PRESSURE: 83 MMHG | HEIGHT: 63 IN | BODY MASS INDEX: 25.16 KG/M2

## 2024-07-02 DIAGNOSIS — M25.561 CHRONIC PAIN OF RIGHT KNEE: ICD-10-CM

## 2024-07-02 DIAGNOSIS — G89.29 CHRONIC PAIN OF RIGHT KNEE: ICD-10-CM

## 2024-07-02 DIAGNOSIS — M17.11 PRIMARY OSTEOARTHRITIS OF RIGHT KNEE: Primary | ICD-10-CM

## 2024-07-02 DIAGNOSIS — M25.561 RIGHT KNEE PAIN, UNSPECIFIED CHRONICITY: ICD-10-CM

## 2024-07-02 PROCEDURE — 73564 X-RAY EXAM KNEE 4 OR MORE: CPT

## 2024-07-02 PROCEDURE — 20610 DRAIN/INJ JOINT/BURSA W/O US: CPT | Performed by: STUDENT IN AN ORGANIZED HEALTH CARE EDUCATION/TRAINING PROGRAM

## 2024-07-02 PROCEDURE — 99214 OFFICE O/P EST MOD 30 MIN: CPT | Performed by: STUDENT IN AN ORGANIZED HEALTH CARE EDUCATION/TRAINING PROGRAM

## 2024-07-02 PROCEDURE — 73562 X-RAY EXAM OF KNEE 3: CPT

## 2024-07-02 RX ORDER — ROPIVACAINE HYDROCHLORIDE 2 MG/ML
2 INJECTION, SOLUTION EPIDURAL; INFILTRATION; PERINEURAL
Status: COMPLETED | OUTPATIENT
Start: 2024-07-02 | End: 2024-07-02

## 2024-07-02 RX ORDER — TRIAMCINOLONE ACETONIDE 40 MG/ML
80 INJECTION, SUSPENSION INTRA-ARTICULAR; INTRAMUSCULAR
Status: COMPLETED | OUTPATIENT
Start: 2024-07-02 | End: 2024-07-02

## 2024-07-02 RX ADMIN — ROPIVACAINE HYDROCHLORIDE 2 ML: 2 INJECTION, SOLUTION EPIDURAL; INFILTRATION; PERINEURAL at 08:15

## 2024-07-02 RX ADMIN — TRIAMCINOLONE ACETONIDE 80 MG: 40 INJECTION, SUSPENSION INTRA-ARTICULAR; INTRAMUSCULAR at 08:15

## 2024-07-02 NOTE — PROGRESS NOTES
Date: 24  Diane Pike   MRN# [id number]  : 1960      Chief Complaint: Right knee pain    Assessment and Plan:      Primary osteoarthritis of right knee  Diagnostics reviewed and physical exam performed.  Diagnosis, treatment options and associated risks were discussed with the patient including no treatment, nonsurgical treatment and potential for surgical intervention.  The patient was given the opportunity to ask questions regarding each.  Quality of life decision to pursue elective right total knee arthroplasty.  She was offered, accepted, performed injection of cortisone today to her right knee for symptomatic relief.  She tolerated the procedure well.  Tylenol 1000 mg up to 3 times daily as needed  Ice and postinjection protocol advised.  Weightbearing activity as tolerated  Return in about 3 months (around 10/2/2024) for re-check, or sooner if symptoms worsen or fail to improve.       Subjective:     Knee Pain  Patient complains of left knee pain. This is evaluated as a second opinion, without an injury. The pain began  in an insidious onset the pain is located medial.  She describes the symptoms as aching, dull, sharp, stabbing, and throbbing. Symptoms improve with rest, ice. The symptoms are worse with activity, stair climbing, kneeling, weight bearing. The knee has not given out or felt unstable. The patient can bend and straighten the knee fully.  No sports. Treatment to date has been ice, Tylenol, NSAID's, cortisone injection, without significant relief.  Last injection of cortisone in 2023 with only several hours of relief.    External Records Reviewed: office notes through Dr. Kali FELICIANO    Allergy:  No Known Allergies  Medications:  all current active meds have been reviewed  Past Medical History:  Past Medical History:   Diagnosis Date    Hypertension      Past Surgical History:  Past Surgical History:   Procedure Laterality Date    BREAST BIOPSY Left      "EXPLORATORY LAPAROTOMY W/ BOWEL RESECTION N/A 07/06/2022    Procedure: LAPAROTOMY EXPLORATORY, LYSIS OF ADHESIONS;  Surgeon: Anibal Souza DO;  [facility];  Service: General     Family History:  Family History   Problem Relation Age of Onset    Uterine cancer Mother 36    Breast cancer Cousin 55    Breast cancer Cousin         age unknown    Breast cancer Paternal Aunt 52     Social History:  Social History     Substance and Sexual Activity   Alcohol Use Yes    Comment: rarely     Social History     Substance and Sexual Activity   Drug Use Not Currently     Social History     Tobacco Use   Smoking Status Every Day    Current packs/day: 0.50    Types: Cigarettes   Smokeless Tobacco Never           ROS:   Review of Systems   All other systems reviewed and are negative.       Objective:   BP Readings from Last 1 Encounters:   07/02/24 145/83      Wt Readings from Last 1 Encounters:   07/02/24 64.4 kg (142 lb)      Pulse Readings from Last 1 Encounters:   07/02/24 80      BMI: Estimated body mass index is 25.15 kg/m² as calculated from the following:    Height as of this encounter: 5' 3\" (1.6 m).    Weight as of this encounter: 64.4 kg (142 lb).        Physical Exam  Vitals reviewed.   Constitutional:       Appearance: Normal appearance. She is normal weight.   Neurological:      General: No focal deficit present.      Mental Status: She is alert and oriented to person, place, and time.   Psychiatric:         Mood and Affect: Mood normal.         Behavior: Behavior normal.         Thought Content: Thought content normal.         Judgment: Judgment normal.          Gait and Station:   antalgic      Right knee:     Inspection:  normal color, temperature, turgor and moisture    Overall limb alignment: varus    Effusion: no    ROM 0 to 110 with pain and crepitation    Extensor Lag: Absent    Palpation: Medial joint line tenderness to palpation    stable to AP translation at 90 deg    Coronal plane stable in full " "extension    Coronal plane stable in mid-flexion     Motor: 5/5 EHL/FHL/TA/GS/Qd/Hs    Vascular: Toes WWP with BCR    Sensory: SILT DP/SP/Barrie/Saph/Tib    Images:    I personally reviewed relevant images in the PACS system and my interpretation is as follows:  X-rays of the right and left knees reveal end stage degenerative changes at her right knee with subchondral sclerosis, joint space narrowing, and osteophyte formation.  Her left knee has well-preserved joint space    Large joint arthrocentesis: R knee  Universal Protocol:  Consent: Verbal consent obtained.  Risks and benefits: risks, benefits and alternatives were discussed  Consent given by: patient  Time out: Immediately prior to procedure a \"time out\" was called to verify the correct patient, procedure, equipment, support staff and site/side marked as required.  Timeout called at: 7/2/2024 8:50 AM.  Patient understanding: patient states understanding of the procedure being performed  Site marked: the operative site was marked  Patient identity confirmed: verbally with patient  Supporting Documentation  Indications: pain and diagnostic evaluation   Procedure Details  Location: knee - R knee  Preparation: Patient was prepped and draped in the usual sterile fashion  Needle size: 22 G  Ultrasound guidance: no  Medications administered: 80 mg triamcinolone acetonide 40 mg/mL; 2 mL ropivacaine 0.2 %    Patient tolerance: patient tolerated the procedure well with no immediate complications  Dressing:  Sterile dressing applied          Lee Harrison MD  Adult Reconstruction Specialist   [facility]      Scribe Attestation      I,:  Hunter Ramirez am acting as a scribe while in the presence of the attending physician.:       I,:  Lee Harrison MD personally performed the services described in this documentation    as scribed in my presence.:             "

## 2024-07-02 NOTE — ASSESSMENT & PLAN NOTE
Diagnostics reviewed and physical exam performed.  Diagnosis, treatment options and associated risks were discussed with the patient including no treatment, nonsurgical treatment and potential for surgical intervention.  The patient was given the opportunity to ask questions regarding each.  Quality of life decision to pursue elective right total knee arthroplasty.  She was offered, accepted, performed injection of cortisone today to her right knee for symptomatic relief.  She tolerated the procedure well.  Tylenol 1000 mg up to 3 times daily as needed  Ice and postinjection protocol advised.  Weightbearing activity as tolerated  Return in about 3 months (around 10/2/2024) for re-check, or sooner if symptoms worsen or fail to improve.

## 2024-08-08 ENCOUNTER — OFFICE VISIT (OUTPATIENT)
Dept: OBGYN CLINIC | Facility: CLINIC | Age: 64
End: 2024-08-08
Payer: COMMERCIAL

## 2024-08-08 VITALS
BODY MASS INDEX: 24.98 KG/M2 | SYSTOLIC BLOOD PRESSURE: 147 MMHG | DIASTOLIC BLOOD PRESSURE: 84 MMHG | HEART RATE: 77 BPM | WEIGHT: 141 LBS | HEIGHT: 63 IN

## 2024-08-08 DIAGNOSIS — M17.11 PRIMARY OSTEOARTHRITIS OF RIGHT KNEE: Primary | ICD-10-CM

## 2024-08-08 DIAGNOSIS — M17.12 ARTHRITIS OF LEFT KNEE: ICD-10-CM

## 2024-08-08 PROCEDURE — 99215 OFFICE O/P EST HI 40 MIN: CPT | Performed by: STUDENT IN AN ORGANIZED HEALTH CARE EDUCATION/TRAINING PROGRAM

## 2024-08-08 RX ORDER — FERROUS SULFATE 324(65)MG
324 TABLET, DELAYED RELEASE (ENTERIC COATED) ORAL EVERY OTHER DAY
Qty: 15 TABLET | Refills: 0 | Status: SHIPPED | OUTPATIENT
Start: 2024-08-08 | End: 2024-09-07

## 2024-08-08 RX ORDER — ACETAMINOPHEN 325 MG/1
975 TABLET ORAL ONCE
OUTPATIENT
Start: 2024-08-08 | End: 2024-08-08

## 2024-08-08 RX ORDER — ASCORBIC ACID 500 MG
500 TABLET ORAL 2 TIMES DAILY
Qty: 60 TABLET | Refills: 0 | Status: SHIPPED | OUTPATIENT
Start: 2024-08-08 | End: 2024-09-07

## 2024-08-08 RX ORDER — CHLORHEXIDINE GLUCONATE 40 MG/ML
SOLUTION TOPICAL DAILY PRN
OUTPATIENT
Start: 2024-08-08

## 2024-08-08 RX ORDER — CHLORHEXIDINE GLUCONATE ORAL RINSE 1.2 MG/ML
15 SOLUTION DENTAL ONCE
OUTPATIENT
Start: 2024-08-08 | End: 2024-08-08

## 2024-08-08 RX ORDER — FOLIC ACID 1 MG/1
1 TABLET ORAL DAILY
Qty: 30 TABLET | Refills: 0 | Status: SHIPPED | OUTPATIENT
Start: 2024-08-08 | End: 2024-09-07

## 2024-08-08 RX ORDER — MULTIVIT-MIN/IRON FUM/FOLIC AC 7.5 MG-4
1 TABLET ORAL DAILY
Qty: 30 TABLET | Refills: 0 | Status: SHIPPED | OUTPATIENT
Start: 2024-08-08 | End: 2024-09-07

## 2024-08-08 NOTE — ASSESSMENT & PLAN NOTE
Patient is a Middle-Aged (Approx 40-64yo) female with with pain at rest or at night  , functional instability, mechanical symptoms and addressed modifiable risk factors.  Radiographic evaluation demonstrates severe degenerative changes in the medial compartment with well-preserved lateral and patellofemoral compartments. Physical exam reveals correctable varus and full range of motion. Given these findings, I recommend:     -Surgical treatment in the form of right medial partial versus total knee arthroplasty  -Informed consent obtained today  -Patient will be seen 2 weeks postop

## 2024-08-08 NOTE — PROGRESS NOTES
Date: 24  Diane Pike   MRN# [id number]  : 1960      Chief Complaint: Right Knee Pain    Assessment and Plan:  The patient verbalized understanding of exam findings and treatment plan. We engaged in the shared decision-making process and treatment options were discussed at length with the patient. Surgical and conservative management discussed today along with risks and benefits. Patient was agreeable with the plan and all questions were answered to satisfaction.     Primary osteoarthritis of right knee  Patient is a Middle-Aged (Approx 40-64yo) female with with pain at rest or at night  , functional instability, mechanical symptoms and addressed modifiable risk factors.  Radiographic evaluation demonstrates severe degenerative changes in the medial compartment with well-preserved lateral and patellofemoral compartments. Physical exam reveals correctable varus and full range of motion. Given these findings, I recommend:     -Surgical treatment in the form of right medial partial versus total knee arthroplasty  -Informed consent obtained today  -Patient will be seen 2 weeks postop    TOTAL KNEE REPLACEMENT INDICATIONS AND RISKS    We had a lengthy discussion with the patient regarding the potential options for treatment. The patient has had an extensive conservative management course up until this time and therefore I do not feel that any additional conservative management will provide additional relief. The patient's symptoms have progressively worsened to the point where they now limit the patient's daily activities and quality of life.     At this time, I feel that this patient would be an excellent candidate for a partial knee replacement. The patient has failed non-operative care and continues to have unacceptable symptoms. We discussed the treatment options and alternatives and the risks and benefits of surgery in great detail.    While no guarantees can be made, partial knee  replacement has a very high success rate in terms of relieving a patient's knee pain and returning them to a more active, independent lifestyle for 10-15 years or more. All surgery carries some risk; for knee replacement, the complication rate is low but may include: death (very rare), infection, bleeding requiring transfusion, blood clots in legs traveling to lungs, nerve and/or blood vessel damage, bone fracture, persistent knee pain and/or stiffness, and repeat surgery(ies). The risk of a major complication is about 1-2 per 1000 cases. Partial knee replacement should only be done if conservative treatment has failed. The revision rate for total knee replacements is about 1-2% per year; in other words, 85-95% of knee replacements may last 10 years; 75-85% last 20 years; and so on, assuming no injury to the knee. Finally we discussed anesthesia related complications which will be discussed in greater detail with the anesthesia team before surgery.     The patient was shown knee replacement booklets, diagrams and/or models and all of their questions have been answered at the present time. The patient may call or come in if they have any other concerns or questions. The patient also understands the post-operative rehabilitation process and the need for their cooperation and participation, and that their results may be compromised by their lack of compliance. The patient would like to proceed. Patient is encouraged to seek additional opinions if they so desire. The patient voiced their understanding of the surgical plan and potential complications and wishes to proceed with surgery.        Subjective:   Diane Pike is a 63 y.o. female who is being seen in follow-up for Left knee pain. Patient states that her pain hasn't improved. When we last saw she we recommended ice and postinjection protocol, Tylenol 1000mg 3x per day, WBAT, return in 3 months or sooner if symptoms worsen.  Pain has stayed the same.. No other  orthopedic complaints or concerns.       Prior treatment:  NSAIDs Yes    Bracing No   Physical Therapy No   Cortisone Injections Yes   Viscosupplementation No     Allergy:  No Known Allergies  Medications:  All current active meds have been reviewed   Past Medical History:  Past Medical History:   Diagnosis Date    Hypertension      Past Surgical History:  Past Surgical History:   Procedure Laterality Date    BREAST BIOPSY Left     EXPLORATORY LAPAROTOMY W/ BOWEL RESECTION N/A 07/06/2022    Procedure: LAPAROTOMY EXPLORATORY, LYSIS OF ADHESIONS;  Surgeon: Anibal Souza DO;  Location: [facility];  Service: General     Family History:  Family History   Problem Relation Age of Onset    Uterine cancer Mother 36    Breast cancer Cousin 55    Breast cancer Cousin         age unknown    Breast cancer Paternal Aunt 52     Social History:  Social History     Substance and Sexual Activity   Alcohol Use Yes    Comment: rarely     Social History     Substance and Sexual Activity   Drug Use Not Currently     Social History     Tobacco Use   Smoking Status Every Day    Current packs/day: 0.50    Types: Cigarettes   Smokeless Tobacco Never           Review of Systems:  General- denies fever/chills  HEENT- denies hearing loss or sore throat  Eyes- denies eye pain or visual disturbances, denies red eyes  Respiratory- denies cough or SOB  Cardio- denies chest pain or palpitations  GI- denies abdominal pain  Endocrine- denies urinary frequency  Urinary- denies pain with urination  Musculoskeletal- Negative except noted above  Skin- denies rashes or wounds  Neurological- denies dizziness or headache  Psychiatric- denies anxiety or difficulty concentrating    Objective:   BP Readings from Last 1 Encounters:   08/08/24 147/84      Wt Readings from Last 1 Encounters:   08/08/24 64 kg (141 lb)      Pulse Readings from Last 1 Encounters:   08/08/24 77        BMI: Estimated body mass index is 24.98 kg/m² as calculated from the following:     "Height as of this encounter: 5' 3\" (1.6 m).    Weight as of this encounter: 64 kg (141 lb).    Physical Exam  /84   Pulse 77   Ht 5' 3\" (1.6 m)   Wt 64 kg (141 lb)   BMI 24.98 kg/m²   General/Constitutional: No apparent distress: well-nourished and well developed.  Eyes: normal ocular motion  Cardio: RRR, Normal S1S2, No m/r/g.   Lymphatic: No appreciable lymphadenopathy  Respiratory: Non-labored breathing, CTA b/l no w/c/r  Vascular: No edema, swelling or tenderness, except as noted in detailed exam. Extremities well perfused. No LE edema  Integumentary: No impressive skin lesions present, except as noted in detailed exam.  Neuro: No ataxia or tremors noted  Psych: Normal mood and affect, oriented to person, place and time. Appropriate affect.  Musculoskeletal: Normal, except as noted in detailed exam and in HPI.    Gait and Station:   antalgic    Right Knee Exam:      Inspection:  normal color, temperature, turgor and moisture    Overall limb alignment: Correctable varus    Effusion: none    ROM 0 to 120 without pain    Extensor Lag: Present    Palpation: Medial joint line tenderness to palpation    stable to AP translation at 90 deg    Coronal plane stable in full extension    Coronal plane unstable in mid-flexion     Motor: 5/5 EHL/FHL/TA/GS/Qd/Hs    Vascular: Toes WWP with BCR    Sensory: SILT DP/SP/Barrie/Saph/Ti      Images:    Previously obtained images of the right knee reviewed and demonstrate bone-on-bone articulation in the medial compartment with well-preserved lateral and patellofemoral compartments.      Scribe Attestation      I,:  Laureano Griffiths am acting as a scribe while in the presence of the attending physician.:       I,:  Lee Harrison MD personally performed the services described in this documentation    as scribed in my presence.:                      Lee Harrison MD  Adult Reconstruction Specialist   [facility]   "

## 2024-08-30 DIAGNOSIS — M17.11 PRIMARY OSTEOARTHRITIS OF RIGHT KNEE: ICD-10-CM

## 2024-08-30 RX ORDER — ASCORBIC ACID 500 MG
500 TABLET ORAL 2 TIMES DAILY
Qty: 180 TABLET | Refills: 1 | Status: SHIPPED | OUTPATIENT
Start: 2024-08-30

## 2024-08-30 RX ORDER — FOLIC ACID 1 MG/1
1000 TABLET ORAL DAILY
Qty: 90 TABLET | Refills: 1 | Status: SHIPPED | OUTPATIENT
Start: 2024-08-30

## 2024-09-03 DIAGNOSIS — M17.11 PRIMARY OSTEOARTHRITIS OF RIGHT KNEE: ICD-10-CM

## 2024-09-03 RX ORDER — FERROUS SULFATE 324(65)MG
324 TABLET, DELAYED RELEASE (ENTERIC COATED) ORAL EVERY OTHER DAY
Qty: 15 TABLET | Refills: 0 | Status: SHIPPED | OUTPATIENT
Start: 2024-09-03 | End: 2024-10-03

## 2024-09-03 RX ORDER — ASCORBIC ACID 500 MG
500 TABLET ORAL 2 TIMES DAILY
Qty: 180 TABLET | Refills: 1 | Status: SHIPPED | OUTPATIENT
Start: 2024-09-03

## 2024-09-08 DIAGNOSIS — M17.11 UNILATERAL PRIMARY OSTEOARTHRITIS, RIGHT KNEE: ICD-10-CM

## 2024-09-09 RX ORDER — MULTIVITAMIN WITH FOLIC ACID 400 MCG
1 TABLET ORAL DAILY
Qty: 30 TABLET | Refills: 1 | Status: SHIPPED | OUTPATIENT
Start: 2024-09-09

## 2024-09-11 ENCOUNTER — TELEPHONE (OUTPATIENT)
Age: 64
End: 2024-09-11

## 2024-09-11 NOTE — TELEPHONE ENCOUNTER
Caller: [facility]     Doctor: Christopher    Reason for call: they are calling to request the CPT codes, diagnosis codes for patient's upcoming surgery in Nov. They asked if we could please reach out to patient directly with this information and she will be able to get back in touch with them      Patient s call back [phone]

## 2024-09-12 NOTE — TELEPHONE ENCOUNTER
Spoke with patient and provided CPT and ICD-10 codes.   Subjective:      Patient ID: Leisa Menchaca is a 13 y.o. male. Visit Information    Have you changed or started any medications since your last visit including any over-the-counter medicines, vitamins, or herbal medicines? no   Are you having any side effects from any of your medications? -  no  Have you stopped taking any of your medications? Is so, why? -  no    Have you seen any other physician or provider since your last visit? No  Have you had any other diagnostic tests since your last visit? No  Have you been seen in the emergency room and/or had an admission to a hospital since we last saw you? No  Have you had your routine dental cleaning in the past 6 months? yes - routine    Have you activated your [facility] account? If not, what are your barriers?  Yes     Patient Care Team:  BERT Rodgers CNP as PCP - General (Certified Nurse Practitioner)  BERT Rodgers CNP as PCP - [facility] Attributed Provider    Medical History Review  Past Medical, Family, and Social History reviewed and does contribute to the patient presenting condition    Health Maintenance   Topic Date Due    Hepatitis A vaccine (2 of 2 - 2-dose series) 01/20/2019    HIV screen  02/12/2019    Flu vaccine (Season Ended) 01/17/2020 (Originally 9/1/2019)    Meningococcal (ACWY) Vaccine (2 - 2-dose series) 02/12/2020    DTaP/Tdap/Td vaccine (6 - Td) 08/23/2021    Hepatitis B Vaccine  Completed    HPV vaccine  Completed    Polio vaccine 0-18  Completed    Measles,Mumps,Rubella (MMR) vaccine  Completed    Varicella Vaccine  Completed    Pneumococcal 0-64 years Vaccine  Completed       PHQ Scores 5/13/2019 7/20/2018 2/22/2018 12/16/2016   PHQ2 Score 0 0 0 0   PHQ9 Score 4 0 0 0     Interpretation of Total Score DepressionSeverity: 1-4 = Minimal depression, 5-9 = Mild depression, 10-14 = Moderate depression, 15-19 = Moderately severe depression, 20-27 = Severe depression    Current Outpatient Medications   Medication Sig environmental allergies. Neurological: Positive for headaches. Negative for dizziness and syncope. Psychiatric/Behavioral: Positive for sleep disturbance. Objective:     /66 (Site: Right Upper Arm, Position: Sitting, Cuff Size: Medium Adult)   Pulse 80   Temp 97.5 °F (36.4 °C) (Tympanic)   Resp 16   Ht 5' 6\" (1.676 m)   Wt 135 lb (61.2 kg)   BMI 21.79 kg/m²      Physical Exam   Constitutional: He is oriented to person, place, and time. He appears well-developed. No distress. HENT:   Head: Atraumatic. Right Ear: Hearing, tympanic membrane, external ear and ear canal normal.   Left Ear: Hearing, tympanic membrane, external ear and ear canal normal.   Nose: Mucosal edema and rhinorrhea present. Right sinus exhibits maxillary sinus tenderness. Right sinus exhibits no frontal sinus tenderness. Left sinus exhibits maxillary sinus tenderness. Left sinus exhibits no frontal sinus tenderness. Mouth/Throat: Uvula is midline and mucous membranes are normal. Posterior oropharyngeal erythema (mild with PND) present. No oropharyngeal exudate. Eyes: Pupils are equal, round, and reactive to light. Conjunctivae are normal. Right eye exhibits no discharge. Left eye exhibits no discharge. Neck: Neck supple. Cardiovascular: Normal rate, regular rhythm, normal heart sounds and intact distal pulses. Pulmonary/Chest: Effort normal and breath sounds normal. No respiratory distress. He has no wheezes. Harsh cough in office today   Lymphadenopathy:     He has no cervical adenopathy. Neurological: He is alert and oriented to person, place, and time. Skin: Skin is warm and dry. Psychiatric: He has a normal mood and affect. His behavior is normal.   Nursing note and vitals reviewed. Assessment:      Diagnosis Orders   1. Acute non-recurrent pansinusitis  azithromycin (ZITHROMAX) 250 MG tablet   2.  Cough         Plan:     Proceed with antibiotic as prescribed if no improvement in symptoms in next 48-72 hours   Start zyrtec / Flonase daily   Recommend OTC Tylenol/Motrin for discomfort   Advise to use salt water gargles for sore throat  Advise to take hot water steam to help with congestion  Use saline rinse to nose as needed for congestion. Recommend use humidifier at night. May use hot tea with honey and lemon for sore throat/cough. Keep well hydrated and get well rested  Call if any concerns  Return if symptoms worsen or fail to improve. Luz Marinacharles Francoino and/or parent received counseling on the following healthy behaviors: Nutrition, Increase fluids and Medication Adherence   Patient and/or parent given educational materials - see patient instructions  Discussed use, benefit, and side effects of prescribed medications. Barriers to medication compliance addressed. All patient and/or parent questions answered and voiced understanding. Treatment plan discussed at visit. Continue routine health care follow up.      Requested Prescriptions     Signed Prescriptions Disp Refills    azithromycin (ZITHROMAX) 250 MG tablet 6 tablet 0     Si tablets by mouth day one, then 1 tablet daily by mouth for 4 more days               Electronically signed by BERT Hurst CNP on 5/15/2019 at 11:08 AM

## 2024-09-30 ENCOUNTER — PATIENT MESSAGE (OUTPATIENT)
Dept: OBGYN CLINIC | Facility: CLINIC | Age: 64
End: 2024-09-30

## 2024-09-30 DIAGNOSIS — M17.11 PRIMARY OSTEOARTHRITIS OF RIGHT KNEE: ICD-10-CM

## 2024-09-30 RX ORDER — FERROUS SULFATE 324(65)MG
324 TABLET, DELAYED RELEASE (ENTERIC COATED) ORAL EVERY OTHER DAY
Qty: 15 TABLET | Refills: 0 | Status: SHIPPED | OUTPATIENT
Start: 2024-09-30 | End: 2024-10-30

## 2024-10-01 ENCOUNTER — TELEPHONE (OUTPATIENT)
Dept: OBGYN CLINIC | Facility: HOSPITAL | Age: 64
End: 2024-10-01

## 2024-10-02 NOTE — TELEPHONE ENCOUNTER
Preoperative Elective Admission Assessment- spoke with patient     Living Situation:    Who does pt live with: spouse  What kind of home: multi-level  How do they enter the home: front  How many levels in home: 3  # of steps to enter home: 2  # of steps to second floor: 15  Are there handrails: Yes  Are there landings: Yes  Sleeping arrangement: first/entry floor  Where is Bathroom: entry level   Where is the tub or shower: second floor   Dogs or ther pets: no     First Floor Setup:   Is there a bathroom: Yes  Where would pt sleep: bed     DME: ordering a RW; advised to bring dos   We discussed clearing pathways in the home and making sure there is accessibly to use the walker, for example, removing throw rugs.      Patient's Current Level of Function: Ambulates: Independently    Post-op Caregiver: spouse and DIL  Caregiver Name and phone number for Inpatient discharge needs: Gary (spouse) [phone]   Currently receive any HHC/aides/community supports: No     Post-op Transport: spouse  To/from hospital: spouse  To/from PT 2-3x/week: Surgeon preference  Uses community transport now: No     Outpatient Physical Therapy Site:  Site: surgeon preference   pre and post-op appts scheduled? Yes     Medication Management: self  Preferred Pharmacy for Post-op Medications: [id number] - [place], [place] - [address] [[id number]]   Blood Management Vitamin Regimen: Pt confirms taking as prescribed  Post-op anticoagulant: to be determined by surgical team postoperatively     DC Plan: Pt plans to be discharged home      Barriers to DC identified preoperatively: none identified    BMI: 24.98      Patient Education:  Pt educated on post-op pain, early mobilization (POD0), LOS goals, OP PT goals, and preoperative bathing. Patient educated that our goal is to appropriately discharge patient based off their post-op function while striving to maintain maximal independence. The goal is to discharge patient to home and for them  to attend outpatient physical therapy.    Assigned to care team? Yes

## 2024-10-04 LAB
DME PARACHUTE DELIVERY DATE ACTUAL: NORMAL
DME PARACHUTE DELIVERY DATE REQUESTED: NORMAL
DME PARACHUTE ITEM DESCRIPTION: NORMAL
DME PARACHUTE ORDER STATUS: NORMAL
DME PARACHUTE SUPPLIER NAME: NORMAL
DME PARACHUTE SUPPLIER PHONE: NORMAL

## 2024-10-09 ENCOUNTER — APPOINTMENT (OUTPATIENT)
Dept: LAB | Age: 64
End: 2024-10-09
Payer: COMMERCIAL

## 2024-10-09 ENCOUNTER — LAB REQUISITION (OUTPATIENT)
Dept: LAB | Facility: HOSPITAL | Age: 64
End: 2024-10-09
Payer: COMMERCIAL

## 2024-10-09 DIAGNOSIS — M17.11 PRIMARY OSTEOARTHRITIS OF RIGHT KNEE: ICD-10-CM

## 2024-10-09 DIAGNOSIS — M17.11 UNILATERAL PRIMARY OSTEOARTHRITIS, RIGHT KNEE: ICD-10-CM

## 2024-10-09 LAB
ABO GROUP BLD: NORMAL
ALBUMIN SERPL BCG-MCNC: 4.4 G/DL (ref 3.5–5)
ALP SERPL-CCNC: 65 U/L (ref 34–104)
ALT SERPL W P-5'-P-CCNC: 23 U/L (ref 7–52)
ANION GAP SERPL CALCULATED.3IONS-SCNC: 7 MMOL/L (ref 4–13)
APTT PPP: 24 SECONDS (ref 23–34)
AST SERPL W P-5'-P-CCNC: 19 U/L (ref 13–39)
BASOPHILS # BLD AUTO: 0.07 THOUSANDS/ΜL (ref 0–0.1)
BASOPHILS NFR BLD AUTO: 1 % (ref 0–1)
BILIRUB SERPL-MCNC: 0.51 MG/DL (ref 0.2–1)
BLD GP AB SCN SERPL QL: NEGATIVE
BUN SERPL-MCNC: 16 MG/DL (ref 5–25)
CALCIUM SERPL-MCNC: 9.7 MG/DL (ref 8.4–10.2)
CHLORIDE SERPL-SCNC: 103 MMOL/L (ref 96–108)
CO2 SERPL-SCNC: 28 MMOL/L (ref 21–32)
CREAT SERPL-MCNC: 0.7 MG/DL (ref 0.6–1.3)
EOSINOPHIL # BLD AUTO: 0.21 THOUSAND/ΜL (ref 0–0.61)
EOSINOPHIL NFR BLD AUTO: 3 % (ref 0–6)
ERYTHROCYTE [DISTWIDTH] IN BLOOD BY AUTOMATED COUNT: 13 % (ref 11.6–15.1)
EST. AVERAGE GLUCOSE BLD GHB EST-MCNC: 117 MG/DL
GFR SERPL CREATININE-BSD FRML MDRD: 92 ML/MIN/1.73SQ M
GLUCOSE P FAST SERPL-MCNC: 87 MG/DL (ref 65–99)
HBA1C MFR BLD: 5.7 %
HCT VFR BLD AUTO: 42.7 % (ref 34.8–46.1)
HGB BLD-MCNC: 13.8 G/DL (ref 11.5–15.4)
IMM GRANULOCYTES # BLD AUTO: 0.02 THOUSAND/UL (ref 0–0.2)
IMM GRANULOCYTES NFR BLD AUTO: 0 % (ref 0–2)
INR PPP: 0.87 (ref 0.85–1.19)
LYMPHOCYTES # BLD AUTO: 1.83 THOUSANDS/ΜL (ref 0.6–4.47)
LYMPHOCYTES NFR BLD AUTO: 30 % (ref 14–44)
MCH RBC QN AUTO: 31.6 PG (ref 26.8–34.3)
MCHC RBC AUTO-ENTMCNC: 32.3 G/DL (ref 31.4–37.4)
MCV RBC AUTO: 98 FL (ref 82–98)
MONOCYTES # BLD AUTO: 0.48 THOUSAND/ΜL (ref 0.17–1.22)
MONOCYTES NFR BLD AUTO: 8 % (ref 4–12)
NEUTROPHILS # BLD AUTO: 3.52 THOUSANDS/ΜL (ref 1.85–7.62)
NEUTS SEG NFR BLD AUTO: 58 % (ref 43–75)
NRBC BLD AUTO-RTO: 0 /100 WBCS
PLATELET # BLD AUTO: 230 THOUSANDS/UL (ref 149–390)
PMV BLD AUTO: 9.7 FL (ref 8.9–12.7)
POTASSIUM SERPL-SCNC: 4.6 MMOL/L (ref 3.5–5.3)
PROT SERPL-MCNC: 6.8 G/DL (ref 6.4–8.4)
PROTHROMBIN TIME: 12.2 SECONDS (ref 12.3–15)
RBC # BLD AUTO: 4.37 MILLION/UL (ref 3.81–5.12)
RH BLD: POSITIVE
SODIUM SERPL-SCNC: 138 MMOL/L (ref 135–147)
SPECIMEN EXPIRATION DATE: NORMAL
WBC # BLD AUTO: 6.13 THOUSAND/UL (ref 4.31–10.16)

## 2024-10-09 PROCEDURE — 85025 COMPLETE CBC W/AUTO DIFF WBC: CPT

## 2024-10-09 PROCEDURE — 86900 BLOOD TYPING SEROLOGIC ABO: CPT | Performed by: STUDENT IN AN ORGANIZED HEALTH CARE EDUCATION/TRAINING PROGRAM

## 2024-10-09 PROCEDURE — 85730 THROMBOPLASTIN TIME PARTIAL: CPT

## 2024-10-09 PROCEDURE — 36415 COLL VENOUS BLD VENIPUNCTURE: CPT

## 2024-10-09 PROCEDURE — 86901 BLOOD TYPING SEROLOGIC RH(D): CPT | Performed by: STUDENT IN AN ORGANIZED HEALTH CARE EDUCATION/TRAINING PROGRAM

## 2024-10-09 PROCEDURE — 85610 PROTHROMBIN TIME: CPT

## 2024-10-09 PROCEDURE — 83036 HEMOGLOBIN GLYCOSYLATED A1C: CPT

## 2024-10-09 PROCEDURE — 86850 RBC ANTIBODY SCREEN: CPT | Performed by: STUDENT IN AN ORGANIZED HEALTH CARE EDUCATION/TRAINING PROGRAM

## 2024-10-09 PROCEDURE — 80053 COMPREHEN METABOLIC PANEL: CPT

## 2024-10-10 LAB
ATRIAL RATE: 63 BPM
P AXIS: 75 DEGREES
PR INTERVAL: 152 MS
QRS AXIS: 28 DEGREES
QRSD INTERVAL: 82 MS
QT INTERVAL: 420 MS
QTC INTERVAL: 429 MS
T WAVE AXIS: 23 DEGREES
VENTRICULAR RATE: 63 BPM

## 2024-10-10 PROCEDURE — 93010 ELECTROCARDIOGRAM REPORT: CPT | Performed by: INTERNAL MEDICINE

## 2024-10-17 RX ORDER — MELOXICAM 15 MG/1
15 TABLET ORAL AS NEEDED
COMMUNITY
Start: 2024-10-07 | End: 2024-11-22

## 2024-10-17 RX ORDER — ATORVASTATIN CALCIUM 20 MG/1
TABLET, FILM COATED ORAL
COMMUNITY
Start: 2024-10-07

## 2024-10-17 NOTE — PRE-PROCEDURE INSTRUCTIONS
Pre-Surgery Instructions:   Medication Instructions    acetaminophen (TYLENOL) 325 mg tablet Uses PRN- OK to take day of surgery    amLODIPine (NORVASC) 5 mg tablet Take day of surgery.    ascorbic acid (VITAMIN C) 500 MG tablet Hold day of surgery.    atorvastatin (LIPITOR) 20 mg tablet Take day of surgery.    ferrous sulfate 324 (65 Fe) mg Hold day of surgery.    fluticasone (FLONASE) 50 mcg/act nasal spray Take day of surgery.    folic acid (FOLVITE) 1 mg tablet Hold day of surgery.    meloxicam (MOBIC) 15 mg tablet Stop taking 7 days prior to surgery.    Multiple Vitamin (Daily-Rodrigo Multivitamin) TABS Hold day of surgery.    Medication instructions for day surgery reviewed. Please use only a sip of water to take your instructed medications. Avoid aspirin and all over the counter vitamins, supplements and NSAIDS for one week prior to surgery per anesthesia guidelines. Tylenol is ok to take as needed.     You will receive a call one business day prior to surgery with an arrival time and hospital directions. If your surgery is scheduled on a Monday, the hospital will be calling you on the Friday prior to your surgery. If you have not heard from anyone by 8pm, please call the hospital supervisor through the hospital  at [phone]. ([place] [phone] or [place] [phone]).    Do not eat or drink anything after midnight the night before your surgery, including candy, mints, lifesavers, or chewing gum. Do not drink alcohol 24hrs before your surgery. Try not to smoke at least 24hrs before your surgery.       Follow the pre surgery showering instructions as listed in the “My Surgical Experience Booklet” or otherwise provided by your surgeon's office. Do not use a blade to shave the surgical area 1 week before surgery. It is okay to use a clean electric clippers up to 24 hours before surgery. Do not apply any lotions, creams, including makeup, cologne, deodorant, or perfumes after showering on the day  of your surgery. Do not use dry shampoo, hair spray, hair gel, or any type of hair products.     No contact lenses, eye make-up, or artificial eyelashes. Remove nail polish, including gel polish, and any artificial, gel, or acrylic nails if possible. Remove all jewelry including rings and body piercing jewelry.     Wear causal clothing that is easy to take on and off. Consider your type of surgery.    Ortho Class Content reviewed, pt verb understanding. Confirms has DME - inst to bring RW w/ her DOS. Encouraged to f/u w/ Nurse Navigator. in surgeon office w/ any questions or changes in health between now and surgery date.     Keep any valuables, jewelry, piercings at home. Please bring any specially ordered equipment (sling, braces) if indicated.    Arrange for a responsible person to drive you to and from the hospital on the day of your surgery. Please confirm the visitor policy for the day of your procedure when you receive your phone call with an arrival time.     Call the surgeon's office with any new illnesses, exposures, or additional questions prior to surgery.    Please reference your “My Surgical Experience Booklet” for additional information to prepare for your upcoming surgery.

## 2024-10-21 ENCOUNTER — TELEPHONE (OUTPATIENT)
Age: 64
End: 2024-10-21

## 2024-10-21 ENCOUNTER — TELEPHONE (OUTPATIENT)
Dept: OBGYN CLINIC | Facility: CLINIC | Age: 64
End: 2024-10-21

## 2024-10-21 NOTE — PROGRESS NOTES
Internal Medicine Pre-Operative Evaluation:     Reason for Visit: Pre-operative Evaluation for Risk Stratification and Optimization    Patient ID: Diane Pike is a 63 y.o. female.     Surgery: Arthroplasty of right knee  Referring Provider: Dr. Harrison      Recommendations to Proceed withSurgery    Patient is considered to be Low risk for Medium risk procedure.     After evaluation and discussion with patient with emphasis that all surgery has some degree of inherent risk it is acknowledged by patient this risk is Acceptable.    Patient is optimized and may proceed with planned procedure.     Assessment    Pre-operative Medical Evaluation for planned surgery  Recommendations as listed in PLAN section below  Contact surgical nurse  navigator with any questions regarding preoperative plan or schedule.      Assessment & Plan  Primary osteoarthritis of right knee  Failed outpatient conservative measures  Elected to undergo total joint arthroplasty    Primary hypertension  Cont amlodipine  MRSA carrier             Plan:     1. Further preoperative workup as follows:   - none no further testing required may proceed with surgery    2. Preoperative Medication Management Review performed by PAT nursing  YES    3. Patient requires further consultation with:   No Consults Required    4. Discharge Planning / Barriers to Discharge  none identified - patients has post discharge therapy plan in place, transportation arranged for discharge day, adequate family support at home to assist with discharge to home.        Subjective:           History of Present Illness:     Diane Pike is a 63 y.o. female who presents to the office today for a preoperative consultation at the request of surgeon. The patient understands this is an elective procedure and not emergent. They are electing to undergo planned procedure with an understanding that all surgery has inherent risk. They have worked with their surgeon and failed  conservative treatment measures. Today they present for preoperative risk assessment and recommendations for optimization in preparation for surgery.    Pt seen in surgical optimization center for upcoming proposed surgery. They have failed previous conservative measures and have elected surgical intervention.     Pt meets presurgical lab and BMI optimization goals.      Diane Pike has an IN HOSPITAL cardiac risk of RCI RISK CLASS I (0 risk factors, risk of major cardiac compl. appr. 0.5%) based on RCRI calculator    Cardiac Risk Estimation: per the Revised Cardiac Risk Index (Circ. 100:1043, 1999),         Pre-op Exam    Previous history of bleeding disorders or clots?: No  Previous Anesthesia reaction?: No  Prolonged steroid use in the last 6 months?: No    Assessment of Cardiac Risk:   - Unstable or severe angina or MI in the last 6 weeks or history of stent placement in the last year?: No   - Decompensated heart failure (e.g. New onset heart failure, NYHA  Class IV heart failure, or worsening existing heart failure)?: No  - Significant arrhythmias such as high grade AV block, symptomatic ventricular arrhythmia, newly recognized ventricular tachycardia, supraventricular tachycardia with resting heart rate >100, or symptomatic bradycardia?: No  - Severe heart valve disease including aortic stenosis or symptomatic mitral stenosis?: No      Pre-operative Risk Factors:  Elevated-risk surgery: No    History of cerebrovascular disease: No    History of ischemic heart disease: No  Pre-operative treatment with insulin: No  Pre-operative creatinine >2 mg/dL: No    History of congestive heart failure: No    Duke Activity Status Index (DASI):   DASI Total Score: 42.7  METs: 8        ROS: No TIA's or unusual headaches, no dysphagia.  No prolonged cough. No dyspnea or chest pain on exertion.  No abdominal pain, change in bowel habits, black or bloody stools.  No urinary tract or BPH symptoms.  Positive reported pain in  "arthritic joint. Positive difficulty with gait. No skin rashes or issues.      Objective:    /80   Pulse 70   Ht 5' 3\" (1.6 m)   Wt 64.8 kg (142 lb 12.8 oz)   SpO2 99%   BMI 25.30 kg/m²       General Appearance: no distress, conversive  HEENT: PERRLA, conjuctiva normal; oropharynx clear; mucous membranes moist;   Neck:  Supple, no lymphadenopathy or thyromegaly  Lungs: breath sounds normal, normal respiratory effort, no retractions, expiratory effort normal  CV: normal heart sounds S1/S2, PMI normal   ABD: soft non tender, no masses , no hepatic or splenomegaly  EXT: DP pulses intact, no lymphadenopathy, no edema  Skin: normal turgor, normal texture, no rash  Psych: affect normal, mood normal  Neuro: AAOx3        The following portions of the patient's history were reviewed and updated as appropriate: allergies, current medications, past family history, past medical history, past social history, past surgical history and problem list.     Past History:       Past Medical History:   Diagnosis Date    Arthritis     Hypertension     Past Surgical History:   Procedure Laterality Date    BREAST BIOPSY Left     ENDOMETRIAL ABLATION      EXPLORATORY LAPAROTOMY W/ BOWEL RESECTION N/A 07/06/2022    Procedure: LAPAROTOMY EXPLORATORY, LYSIS OF ADHESIONS;  Surgeon: Anibal Souza DO;  Location: [facility];  Service: General    TUBAL LIGATION            Social History     Tobacco Use    Smoking status: Every Day     Current packs/day: 0.50     Types: Cigarettes    Smokeless tobacco: Never   Vaping Use    Vaping status: Never Used   Substance Use Topics    Alcohol use: Yes     Comment: rarely, not even weekly    Drug use: Not Currently     Family History   Problem Relation Age of Onset    Uterine cancer Mother 36    Breast cancer Cousin 55    Breast cancer Cousin         age unknown    Breast cancer Paternal Aunt 52          Allergies:     No Known Allergies     Current Medications:     Current Outpatient Medications " "  Medication Instructions    acetaminophen (TYLENOL) 650 mg, Oral, Every 4 hours PRN    amLODIPine (NORVASC) 5 mg, Oral, Daily    amLODIPine-benazepril (LOTREL 5-10) 5-10 MG per capsule     ascorbic acid (VITAMIN C) 500 mg, Oral, 2 times daily    atorvastatin (LIPITOR) 20 mg tablet     ferrous sulfate 324 mg, Oral, Every other day    fluticasone (FLONASE) 50 mcg/act nasal spray 1 spray, Nasal, Daily    folic acid (FOLVITE) 1,000 mcg, Oral, Daily    meloxicam (MOBIC) 15 mg, Oral, As needed    Multiple Vitamin (Daily-Rodrigo Multivitamin) TABS 1 tablet, Oral, Daily    Multiple Vitamins-Minerals (multivitamin with minerals) tablet 1 tablet, Oral, Daily           PRE-OP WORKSHEET DATA    Assessment of Pre-Operative Risks     MLJ Quality Hard Stops:    BMI (<40) : Estimated body mass index is 25.3 kg/m² as calculated from the following:    Height as of this encounter: 5' 3\" (1.6 m).    Weight as of this encounter: 64.8 kg (142 lb 12.8 oz).    Hgb ( >11):   Lab Results   Component Value Date    HGB 13.8 10/09/2024    HGB 13.9 07/07/2022    HGB 14.6 07/06/2022       HbA1c (<7.5) :   Lab Results   Component Value Date    HGBA1C 5.7 (H) 10/09/2024       GFR (>60) (Less then 45 = Nephrology consult):    Lab Results   Component Value Date    EGFR 92 10/09/2024    EGFR 84 12/14/2023    EGFR 99 10/08/2022            Pre-Op Data Reviewed:       Laboratory Results: I have personally reviewed the pertinent laboratory results/reports     EKG:I have personally reviewed pertinent reports.  . I personally reviewed and interpreted available tracings in the electronic medical record    Encounter Date: 10/09/24   EKG 12 lead   Result Value    Ventricular Rate 63    Atrial Rate 63    NJ Interval 152    QRSD Interval 82    QT Interval 420    QTC Interval 429    P Axis 75    QRS Axis 28    T Wave Axis 23    Narrative    Normal sinus rhythm  Nonspecific ST abnormality  Abnormal ECG  No previous ECGs available  Confirmed by Korey Crowell " ([id number]) on 10/10/2024 12:32:58 AM       OLD RECORDS: reviewed old records in the chart review section if EHR on day of visit.    Previous cardiopulmonary studies within the past year:  Echocardiogram: no   Cardiac Catheterization: no  Stress Test: no      Time of visit including pre-visit chart review, visit and post-visit coordination of plan and care , review of pre-surgical lab work, preparation and time spent documenting note in electronic medical record, time spent face-to-face in physical examination answering patient questions by care team 45 minutes             [facility]

## 2024-10-23 ENCOUNTER — ANESTHESIA EVENT (OUTPATIENT)
Age: 64
End: 2024-10-23

## 2024-10-23 ENCOUNTER — ANESTHESIA (OUTPATIENT)
Age: 64
End: 2024-10-23

## 2024-10-24 DIAGNOSIS — M17.11 PRIMARY OSTEOARTHRITIS OF RIGHT KNEE: ICD-10-CM

## 2024-10-24 RX ORDER — FERROUS SULFATE 324(65)MG
324 TABLET, DELAYED RELEASE (ENTERIC COATED) ORAL EVERY OTHER DAY
Qty: 45 TABLET | Refills: 1 | Status: SHIPPED | OUTPATIENT
Start: 2024-10-24 | End: 2024-11-23

## 2024-10-25 ENCOUNTER — OFFICE VISIT (OUTPATIENT)
Age: 64
End: 2024-10-25
Payer: COMMERCIAL

## 2024-10-25 VITALS
SYSTOLIC BLOOD PRESSURE: 136 MMHG | OXYGEN SATURATION: 99 % | WEIGHT: 142.8 LBS | HEIGHT: 63 IN | HEART RATE: 70 BPM | BODY MASS INDEX: 25.3 KG/M2 | DIASTOLIC BLOOD PRESSURE: 80 MMHG

## 2024-10-25 DIAGNOSIS — I10 PRIMARY HYPERTENSION: ICD-10-CM

## 2024-10-25 DIAGNOSIS — M17.11 PRIMARY OSTEOARTHRITIS OF RIGHT KNEE: Primary | ICD-10-CM

## 2024-10-25 DIAGNOSIS — Z22.322 MRSA CARRIER: ICD-10-CM

## 2024-10-25 PROCEDURE — 99205 OFFICE O/P NEW HI 60 MIN: CPT | Performed by: INTERNAL MEDICINE

## 2024-10-25 RX ORDER — MUPIROCIN 20 MG/G
OINTMENT TOPICAL 2 TIMES DAILY
Qty: 22 G | Refills: 0 | Status: SHIPPED | OUTPATIENT
Start: 2024-10-25 | End: 2024-10-30

## 2024-10-25 RX ORDER — AMLODIPINE AND BENAZEPRIL HYDROCHLORIDE 5; 10 MG/1; MG/1
CAPSULE ORAL
COMMUNITY
Start: 2024-10-10

## 2024-10-25 NOTE — PATIENT INSTRUCTIONS
BEFORE SURGERY    Contact your surgical nurse  navigator with any questions regarding preoperative plan or schedule.  Stop all over the counter supplements, herbal, naturopathic  medications for 1 week prior to surgery UNLESS prescribed by your surgeon  Hold NSAIDS (i.e. advil, alleve, motrin, ibuprofen, celebrex) minimum 5 days prior to surgery  Follow presurgical medication instructions provided by preadmission nursing team reviewed during your presurgery phone call  Strategies for optimizing your surgery through breathing exercises, nutrition and physical activity can be found at [email or web address]  Call [phone] with any presurgical concerns or medications questions or use the messaging feature in your [employer] leonie to contact your provider    AFTER SURGERY    Recommend using Tylenol ( acetaminophen ) 1000 mg every eight hours during the first week post discharge along with icing the area for 20 mins every 3-4 hours while awake can be helpful in reducing your need for post operative opioid use. This opioid sparing plan can be used along side your surgeons pain plan.  Use stool softener over the counter (colace) daily after surgery during the first 1-2 weeks to avoid post operative constipation issues  If no bowel movement within 3 days after surgery then use over the counter Miralax in addition to your stool softener   If cleared by your surgical team for activity then early and often walking is encouraged and can be important in prevention of post surgical blood clots. Additionally spend as much time out of bed as possible and allowed by your surgical team  Use your incentive spirometer twice per hour in the first seven days after surgery to help prevent post surgery lung complications and infections  It is very important you follow the instructions from your surgeon regarding any medications for after surgery blood clot prevention. Compliance with these medications is very important.  Call [phone] with  any post discharge concerns or medical issues or use the messaging feature in your [email or web address] leonie to contact your provider

## 2024-11-04 DIAGNOSIS — M17.11 UNILATERAL PRIMARY OSTEOARTHRITIS, RIGHT KNEE: ICD-10-CM

## 2024-11-04 RX ORDER — MULTIVITAMIN WITH FOLIC ACID 400 MCG
1 TABLET ORAL DAILY
Qty: 30 TABLET | Refills: 1 | Status: SHIPPED | OUTPATIENT
Start: 2024-11-04

## 2024-11-05 ENCOUNTER — ANESTHESIA EVENT (OUTPATIENT)
Age: 64
End: 2024-11-05
Payer: COMMERCIAL

## 2024-11-05 ENCOUNTER — TELEPHONE (OUTPATIENT)
Dept: OBGYN CLINIC | Facility: CLINIC | Age: 64
End: 2024-11-05

## 2024-11-05 NOTE — ANESTHESIA PREPROCEDURE EVALUATION
Procedure:  ARTHROPLASTY KNEE UNICOMPARTMENTAL VERSUS TOTAL, RIGHT;SAME DAY (Right: Knee)    Relevant Problems   CARDIO   (+) Hypertension      MUSCULOSKELETAL   (+) Primary osteoarthritis of right knee      Past Medical History:   Diagnosis Date    Arthritis     Hypertension      Lab Results   Component Value Date    WBC 6.13 10/09/2024    HGB 13.8 10/09/2024    HCT 42.7 10/09/2024    MCV 98 10/09/2024     10/09/2024       Physical Exam    Airway    Mallampati score: II  TM Distance: >3 FB  Neck ROM: full     Dental    upper dentures and lower dentures    Cardiovascular  Rhythm: regular, Rate: normal    Pulmonary   Breath sounds clear to auscultation    Other Findings  Intercisor Distance > 3cm    post-pubertal.      Anesthesia Plan  ASA Score- 2     Anesthesia Type- spinal with ASA Monitors.         Additional Monitors:     Airway Plan:     Comment: Discussed benefits/risks of neuraxial anesthesia including possibility of discomfort at site of injection, post-dural puncture headache, block failure, and more rare complications such as bleeding, infection, and permanent nerve damage. If block fails or there is difficulty placing neuraxial block, general anesthesia was discussed as back-up plan. Patient understands and wishes to proceed.     Discussed nerve block to assist with post-operative analgesia. Discussed possibility of uncommon complications including permanent nerve injury, damage to blood vessels, infection local anesthetic toxicity, and nerve block failure. Patient understands and wishes to proceed.     All questions answered.   .       Plan Factors-Exercise tolerance (METS): >4 METS.    Chart reviewed. EKG reviewed.  Existing labs reviewed. Patient summary reviewed.    Patient is a current smoker.  Patient instructed to abstain from smoking on day of procedure. Patient smoked on day of surgery.    There is medical exclusion for perioperative obstructive sleep apnea risk  education.        Induction- intravenous.    Postoperative Plan- Plan for postoperative opioid use.         Informed Consent- Anesthetic plan and risks discussed with patient.  I personally reviewed this patient with the CRNA. Discussed and agreed on the Anesthesia Plan with the CRNA..

## 2024-11-05 NOTE — TELEPHONE ENCOUNTER
Caller: Diane    Reason for call: Patient wants to make sure Hospital is aware the surgery is cancelled due insurance denial. She just did not want them to call her.  I do not see it cancelled as of yet.     Call back#: [phone]

## 2024-11-05 NOTE — TELEPHONE ENCOUNTER
Spoke with patient to inform surgery needs to be rescheduled due to insurance denial. Patient agreed upon 12/4/24. Related appointments updated.    Dr. Harrison, please place order for P/T ( per insurance denial).  Also reorder labs/EKG and Folic Acid only.    Thanks!

## 2024-11-06 ENCOUNTER — ANESTHESIA (OUTPATIENT)
Age: 64
End: 2024-11-06
Payer: COMMERCIAL

## 2024-11-07 ENCOUNTER — PATIENT MESSAGE (OUTPATIENT)
Dept: OBGYN CLINIC | Facility: CLINIC | Age: 64
End: 2024-11-07

## 2024-11-07 DIAGNOSIS — M17.11 PRIMARY OSTEOARTHRITIS OF RIGHT KNEE: Primary | ICD-10-CM

## 2024-11-07 RX ORDER — FOLIC ACID 1 MG/1
1 TABLET ORAL DAILY
Qty: 30 TABLET | Refills: 0 | Status: SHIPPED | OUTPATIENT
Start: 2024-11-07

## 2024-11-07 RX ORDER — MUPIROCIN 20 MG/G
OINTMENT TOPICAL DAILY
Qty: 15 G | Refills: 0 | Status: SHIPPED | OUTPATIENT
Start: 2024-11-07

## 2024-11-07 NOTE — PROGRESS NOTES
PT Evaluation     Today's date: 2024  Patient name: Diane Pike  : 1960  MRN: [id number]  Referring provider: Orlando Street PA-C  Dx:   Encounter Diagnosis     ICD-10-CM    1. Primary osteoarthritis of right knee  M17.11 Ambulatory Referral to Physical Therapy          Start Time: 0700  Stop Time: 0735  Total time in clinic (min): 35 minutes    Assessment  Impairments: abnormal gait, abnormal muscle firing, abnormal or restricted ROM, abnormal movement, activity intolerance, impaired balance, impaired physical strength, lacks appropriate home exercise program, pain with function and weight-bearing intolerance  Symptom irritability: high    Assessment details: Patient is a 62 y/o female presenting to PT for a pre-op appointment for a R TKA vs partial arthoplasty scheduled on 24. Upon clinical exam, patient presents with decreased R knee P/AROM, decreased R knee strength, R knee edema, decreased R quad stability, and abnormal gait. 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 Exam findings and clinical signs and symptoms are suggestive of R knee OA. Patient will benefit from physical therapy to address the above impairments and maximize functional mobility.     Understanding of Dx/Px/POC: good     Prognosis: good    Goals  Post-op Goals    STG - to be met by week 4  1. Patient will be independent with HEP throughout therapy to prepare for eventual transition to maintenance program.  2. Patient will improve subjective pain to <=4/10 at worst to improve QOL.  3. Patient will improve R knee flexion AROM to 90 deg to perform ADLs with less difficulty.    LTG - to be met by discharge   1. Patient will improve R knee flexion AROM to 120 deg to perform ADLs with less difficulty.  2. Patient will improve R knee strength to 5/5 to improve functional mobility.  3. Patient will improve FOTO score to age matched prediction  to demonstrate functional improvements.  4. Patient will be able to walk 3 miles without difficulty to return to PLOF.  5. Patient will be able to perform her work duties without difficulty to return to PLOF.        Plan  Patient would benefit from: skilled physical therapy  Referral necessary: No  Planned modality interventions: cryotherapy and thermotherapy: hydrocollator packs    Planned therapy interventions: abdominal trunk stabilization, activity modification, balance/weight bearing training, body mechanics training, flexibility, functional ROM exercises, graded activity, graded exercise, home exercise program, manual therapy, neuromuscular re-education, patient/caregiver education, postural training, strengthening, stretching, therapeutic activities, therapeutic exercise and gait training    Frequency: 2x week  Duration in weeks: 12  Treatment plan discussed with: patient        Subjective Evaluation    History of Present Illness  Date of onset: 11/1/2023  Mechanism of injury: Patient presents for a pre-op appointment for a R TKA vs partial arthoplasty scheduled on 12/4/24. Patient reports she has had chronic R knee pain and OA beginning in Nov of 2023 leading to the surgery. Patient reports difficulty with prolonged walking, prolonged activity, using a forklift, and performing her work duties due to her pain. Notes she had injections in her knee in the past which helped temporarily. Denies N/T but notes shooting pain into her medial lower leg. Notes her knee also swells at the end of a work day. Reports she would like to get back to working and walking 3-5 miles with her . Reports she is agreeable to continue PT until her surgery.              Recurrent probem    Patient Goals  Patient goals for therapy: decreased pain, improved balance, increased motion, increased strength, independence with ADLs/IADLs and return to sport/leisure activities  Patient goal: walking with her ,  working  Pain  Current pain ratin  At best pain ratin  At worst pain rating: 10  Location: R knee  Quality: throbbing  Relieving factors: ice and rest  Progression: worsening    Social Support  Steps to enter house: yes (2)  Stairs in house: yes   Lives with: spouse    Employment status: working ([employer])    Diagnostic Tests  X-ray: abnormal (R knee: Severe medial compartment degenerative changes as above. Varus angulation secondarily. Minimal osteophyte formation of the medial tibial plateau. Small effusion)  Treatments  Previous treatment: injection treatment and medication  Current treatment: physical therapy        Objective     Observations     Additional Observation Details  Gait analysis: antalgic  Mild R knee edema    Palpation     Additional Palpation Details  TTP R medial knee musculature, medial joint line    Active Range of Motion   Left Knee   Flexion: 140 degrees   Extension: 0 degrees     Right Knee   Flexion: 115 degrees with pain  Extension: 2 degrees     Passive Range of Motion   Left Knee   Normal passive range of motion    Right Knee   Flexion: 120 degrees with pain  Extension: 0 degrees with pain    Additional Passive Range of Motion Details  Pain and stiffness noted with R knee flexion PROM    Mobility   Patellar Mobility:     Right Knee   Hypomobile: medial, lateral, superior and inferior   Tibiofemoral Mobility:   Right knee Hypomobile in the anterior and posterior tibiofemoral tendon(s).     Strength/Myotome Testing     Left Knee   Flexion: 5  Extension: 5  Quadriceps contraction: good    Right Knee   Flexion: 3+ (pain)  Extension: 4-  Quadriceps contraction: fair    Additional Strength Details  Unable to complete R SLR without pain    Tests     Right Knee   Negative anterior drawer, anterior Lachman, posterior drawer, valgus stress test at 30 degrees and varus stress test at 30 degrees.            Precautions: HTN, R TKA vs partial arthoplasty 24      Manuals              R knee PROM             R knee STM                                       Neuro Re-Ed             Quad sets HEP            LAQ HEP            Clam shells HEP            SLR             SLS                                       Ther Ex             Bike             Heel slides HEP            Longsit gastroc stretch             Leg press             Supine HS stretch             Prone quad stretch                                       Ther Activity             STS             Step ups             Gait Training             RW                          Modalities             Cryotherapy Edu

## 2024-11-08 ENCOUNTER — EVALUATION (OUTPATIENT)
Dept: PHYSICAL THERAPY | Facility: CLINIC | Age: 64
End: 2024-11-08
Payer: COMMERCIAL

## 2024-11-08 DIAGNOSIS — M17.11 PRIMARY OSTEOARTHRITIS OF RIGHT KNEE: ICD-10-CM

## 2024-11-08 PROCEDURE — 97162 PT EVAL MOD COMPLEX 30 MIN: CPT

## 2024-11-08 PROCEDURE — 97110 THERAPEUTIC EXERCISES: CPT

## 2024-11-12 ENCOUNTER — TELEPHONE (OUTPATIENT)
Dept: OBGYN CLINIC | Facility: CLINIC | Age: 64
End: 2024-11-12

## 2024-11-12 NOTE — H&P (VIEW-ONLY)
Internal Medicine Pre-Operative Evaluation:     Reason for Visit: Pre-operative Evaluation for Risk Stratification and Optimization    Patient ID: Diane Pike is a 63 y.o. female.     Surgery: Arthroplasty of right knee  Referring Provider: Dr. Harrison      Recommendations to Proceed withSurgery    Patient is considered to be Low risk for Medium risk procedure.     After evaluation and discussion with patient with emphasis that all surgery has some degree of inherent risk it is acknowledged by patient this risk is Acceptable.    Patient is optimized and may proceed with planned procedure.     Assessment    Pre-operative Medical Evaluation for planned surgery  Recommendations as listed in PLAN section below  Contact surgical nurse  navigator with any questions regarding preoperative plan or schedule.      Assessment & Plan  Primary osteoarthritis of right knee  Failed outpatient conservative measures  Electing to undergo arthroplasty    Primary hypertension  Stable  Monitor post operative BP   Avoid hypotension if at all possible  Refer to PAT instructions regarding medication administration the morning of surgery    Preoperative clearance    Tobacco use  Smokes 1/2 ppd  Not interested in quitting or referral to lifestyle management  She is going to attempt to quit leading to surgery and maintain until after incision is healed  She is aware of risk of poor healing d/t same           Plan:     1. Further preoperative workup as follows:   - none no further testing required may proceed with surgery    2. Preoperative Medication Management Review performed by PAT nursing  NO    3. Patient requires further consultation with:   No Consults Required    4. Discharge Planning / Barriers to Discharge  none identified - patients has post discharge therapy plan in place, transportation arranged for discharge day, adequate family support at home to assist with discharge to home.        Subjective:           History of  Present Illness:     Diane Pike is a 63 y.o. female who presents to the office today for a preoperative consultation at the request of surgeon. The patient understands this is an elective procedure and not emergent. They are electing to undergo planned procedure with an understanding that all surgery has inherent risk. They have worked with their surgeon and failed conservative treatment measures. Today they present for preoperative risk assessment and recommendations for optimization in preparation for surgery.    Pre-op Exam    Pt seen in surgical optimization center for upcoming proposed surgery. They have failed previous conservative measures and have elected surgical intervention.     Pt meets presurgical lab and BMI optimization goals.      Diane Pike has an IN HOSPITAL cardiac risk of RCI RISK CLASS I (0 risk factors, risk of major cardiac compl. appr. 0.5%) based on RCRI calculator    Cardiac Risk Estimation: per the Revised Cardiac Risk Index (Circ. 100:1043, 1999),           Previous history of bleeding disorders or clots?: No  Previous Anesthesia reaction?: No  Prolonged steroid use in the last 6 months?: No    Assessment of Cardiac Risk:   - Unstable or severe angina or MI in the last 6 weeks or history of stent placement in the last year?: No   - Decompensated heart failure (e.g. New onset heart failure, NYHA  Class IV heart failure, or worsening existing heart failure)?: No  - Significant arrhythmias such as high grade AV block, symptomatic ventricular arrhythmia, newly recognized ventricular tachycardia, supraventricular tachycardia with resting heart rate >100, or symptomatic bradycardia?: No  - Severe heart valve disease including aortic stenosis or symptomatic mitral stenosis?: No      Pre-operative Risk Factors:  Elevated-risk surgery: No    History of cerebrovascular disease: No    History of ischemic heart disease: No  Pre-operative treatment with insulin: No  Pre-operative creatinine  ">2 mg/dL: No    History of congestive heart failure: No    D[facility] Activity Status Index (DASI):   DASI Total Score: 23.45  METs: 5.6        ROS: No TIA's or unusual headaches, no dysphagia.  No prolonged cough. No dyspnea or chest pain on exertion.  No abdominal pain, change in bowel habits, black or bloody stools.  No urinary tract or BPH symptoms.  Positive reported pain in arthritic joint. Positive difficulty with gait. No skin rashes or issues.      Objective:    /70   Pulse 70   Ht 5' 4\" (1.626 m)   Wt 67.1 kg (148 lb)   SpO2 98%   BMI 25.40 kg/m²       General Appearance: no distress, conversive  HEENT: PERRLA, conjuctiva normal; oropharynx clear; mucous membranes moist;   Neck:  Supple, no lymphadenopathy or thyromegaly  Lungs: breath sounds are decreased throughout,  normal respiratory effort, no retractions, expiratory effort normal  CV: normal heart sounds S1/S2, PMI normal   ABD: soft non tender, no masses , no hepatic or splenomegaly  EXT: DP pulses intact, no lymphadenopathy, no edema  Skin: normal turgor, normal texture, no rash  Psych: affect normal, mood normal  Neuro: AAOx3        The following portions of the patient's history were reviewed and updated as appropriate: allergies, current medications, past family history, past medical history, past social history, past surgical history and problem list.     Past History:       Past Medical History:   Diagnosis Date    Arthritis     Hypertension     Past Surgical History:   Procedure Laterality Date    BREAST BIOPSY Left     ENDOMETRIAL ABLATION      EXPLORATORY LAPAROTOMY W/ BOWEL RESECTION N/A 07/06/2022    Procedure: LAPAROTOMY EXPLORATORY, LYSIS OF ADHESIONS;  Surgeon: Anibal Souza DO;  Location: [facility];  Service: General    TUBAL LIGATION            Social History     Tobacco Use    Smoking status: Every Day     Current packs/day: 0.50     Types: Cigarettes    Smokeless tobacco: Never   Vaping Use    Vaping status: Never Used " "  Substance Use Topics    Alcohol use: Yes     Comment: rarely, not even weekly    Drug use: Not Currently     Family History   Problem Relation Age of Onset    Uterine cancer Mother 36    Breast cancer Cousin 55    Breast cancer Cousin         age unknown    Breast cancer Paternal Aunt 52          Allergies:     No Known Allergies     Current Medications:     Current Outpatient Medications   Medication Instructions    acetaminophen (TYLENOL) 650 mg, Oral, Every 4 hours PRN    amLODIPine-benazepril (LOTREL 5-10) 5-10 MG per capsule     ascorbic acid (VITAMIN C) 500 mg, Oral, 2 times daily    atorvastatin (LIPITOR) 20 mg tablet     ferrous sulfate 324 mg, Oral, Every other day    fluticasone (FLONASE) 50 mcg/act nasal spray 1 spray, Nasal, Daily    folic acid (FOLVITE) 1 mg, Oral, Daily, Start 30 days prior to surgery    Multiple Vitamin (Daily-Rodrigo Multivitamin) TABS 1 tablet, Oral, Daily    mupirocin (BACTROBAN) 2 % ointment Topical, Daily           PRE-OP WORKSHEET DATA    Assessment of Pre-Operative Risks     MLJ Quality Hard Stops:    BMI (<40) : Estimated body mass index is 25.4 kg/m² as calculated from the following:    Height as of this encounter: 5' 4\" (1.626 m).    Weight as of this encounter: 67.1 kg (148 lb).    Hgb ( >11):   Lab Results   Component Value Date    HGB 14.0 11/16/2024    HGB 13.8 10/09/2024    HGB 13.9 07/07/2022       HbA1c (<7.5) :   Lab Results   Component Value Date    HGBA1C 5.8 (H) 11/16/2024       GFR (>60) (Less then 45 = Nephrology consult):    Lab Results   Component Value Date    EGFR 89 11/16/2024    EGFR 92 10/09/2024    EGFR 84 12/14/2023            Pre-Op Data Reviewed:       Laboratory Results: I have personally reviewed the pertinent laboratory results/reports     EKG:I have personally reviewed pertinent reports.  . I personally reviewed and interpreted available tracings in the electronic medical record    Encounter Date: 11/16/24   ECG 12 lead   Result Value    " Ventricular Rate 75    Atrial Rate 75    MA Interval 140    QRSD Interval 88    QT Interval 394    QTC Interval 441    P Axis 77    QRS Axis -12    T Wave Axis 55    Narrative    Normal sinus rhythm  Nonspecific ST and T wave abnormality  Abnormal ECG  When compared with ECG of 09-Oct-2024 08:39,  Nonspecific T wave abnormality, worse in Inferior leads  Nonspecific T wave abnormality now evident in Lateral leads  Confirmed by Rakesh Osborne ([id number]) on 11/18/2024 9:34:04 PM       OLD RECORDS: reviewed old records in the chart review section if EHR on day of visit.    Previous cardiopulmonary studies within the past year:  Echocardiogram: no   Cardiac Catheterization: no  Stress Test: no      Time of visit including pre-visit chart review, visit and post-visit coordination of plan and care , review of pre-surgical lab work, preparation and time spent documenting note in electronic medical record, time spent face-to-face in physical examination answering patient questions by care team 35 minutes             [facility]

## 2024-11-12 NOTE — PATIENT INSTRUCTIONS
BEFORE SURGERY    Contact your surgical nurse  navigator with any questions regarding preoperative plan or schedule.  Stop all over the counter supplements, herbal, naturopathic  medications for 1 week prior to surgery UNLESS prescribed by your surgeon  Hold NSAIDS (i.e. advil, alleve, motrin, ibuprofen, celebrex) minimum 5 days prior to surgery  Follow presurgical medication instructions provided by preadmission nursing team reviewed during your presurgery phone call  Strategies for optimizing your surgery through breathing exercises, nutrition and physical activity can be found at [email or web address]  Call [phone] with any presurgical concerns or medications questions or use the messaging feature in your [employer] le[employer]ie to contact your provider  Attempt quitting tobacco use leading into surgery and post operatively until incision is healed to decrease the risk of poor healing    AFTER SURGERY    Recommend using Tylenol ( acetaminophen ) 1000 mg every eight hours during the first week post discharge along with icing the area for 20 mins every 3-4 hours while awake can be helpful in reducing your need for post operative opioid use. This opioid sparing plan can be used along side your surgeons pain plan.  Use stool softener over the counter (colace) daily after surgery during the first 1-2 weeks to avoid post operative constipation issues  If no bowel movement within 3 days after surgery then use over the counter Miralax in addition to your stool softener   If cleared by your surgical team for activity then early and often walking is encouraged and can be important in prevention of post surgical blood clots. Additionally spend as much time out of bed as possible and allowed by your surgical team  Use your incentive spirometer twice per hour in the first seven days after surgery to help prevent post surgery lung complications and infections  It is very important you follow the instructions from your surgeon regarding  any medications for after surgery blood clot prevention. Compliance with these medications is very important.  Call [phone] with any post discharge concerns or medical issues or use the messaging feature in your [employer] leonie to contact your provider

## 2024-11-12 NOTE — PROGRESS NOTES
Internal Medicine Pre-Operative Evaluation:     Reason for Visit: Pre-operative Evaluation for Risk Stratification and Optimization    Patient ID: Diane Pike is a 63 y.o. female.     Surgery: Arthroplasty of right knee  Referring Provider: Dr. Harrison      Recommendations to Proceed withSurgery    Patient is considered to be Low risk for Medium risk procedure.     After evaluation and discussion with patient with emphasis that all surgery has some degree of inherent risk it is acknowledged by patient this risk is Acceptable.    Patient is optimized and may proceed with planned procedure.     Assessment    Pre-operative Medical Evaluation for planned surgery  Recommendations as listed in PLAN section below  Contact surgical nurse  navigator with any questions regarding preoperative plan or schedule.      Assessment & Plan  Primary osteoarthritis of right knee  Failed outpatient conservative measures  Electing to undergo arthroplasty    Primary hypertension  Stable  Monitor post operative BP   Avoid hypotension if at all possible  Refer to PAT instructions regarding medication administration the morning of surgery    Preoperative clearance    Tobacco use  Smokes 1/2 ppd  Not interested in quitting or referral to lifestyle management  She is going to attempt to quit leading to surgery and maintain until after incision is healed  She is aware of risk of poor healing d/t same           Plan:     1. Further preoperative workup as follows:   - none no further testing required may proceed with surgery    2. Preoperative Medication Management Review performed by PAT nursing  NO    3. Patient requires further consultation with:   No Consults Required    4. Discharge Planning / Barriers to Discharge  none identified - patients has post discharge therapy plan in place, transportation arranged for discharge day, adequate family support at home to assist with discharge to home.        Subjective:           History of  Present Illness:     Diane Pike is a 63 y.o. female who presents to the office today for a preoperative consultation at the request of surgeon. The patient understands this is an elective procedure and not emergent. They are electing to undergo planned procedure with an understanding that all surgery has inherent risk. They have worked with their surgeon and failed conservative treatment measures. Today they present for preoperative risk assessment and recommendations for optimization in preparation for surgery.    Pre-op Exam    Pt seen in surgical optimization center for upcoming proposed surgery. They have failed previous conservative measures and have elected surgical intervention.     Pt meets presurgical lab and BMI optimization goals.      Diane Pike has an IN HOSPITAL cardiac risk of RCI RISK CLASS I (0 risk factors, risk of major cardiac compl. appr. 0.5%) based on RCRI calculator    Cardiac Risk Estimation: per the Revised Cardiac Risk Index (Circ. 100:1043, 1999),           Previous history of bleeding disorders or clots?: No  Previous Anesthesia reaction?: No  Prolonged steroid use in the last 6 months?: No    Assessment of Cardiac Risk:   - Unstable or severe angina or MI in the last 6 weeks or history of stent placement in the last year?: No   - Decompensated heart failure (e.g. New onset heart failure, NYHA  Class IV heart failure, or worsening existing heart failure)?: No  - Significant arrhythmias such as high grade AV block, symptomatic ventricular arrhythmia, newly recognized ventricular tachycardia, supraventricular tachycardia with resting heart rate >100, or symptomatic bradycardia?: No  - Severe heart valve disease including aortic stenosis or symptomatic mitral stenosis?: No      Pre-operative Risk Factors:  Elevated-risk surgery: No    History of cerebrovascular disease: No    History of ischemic heart disease: No  Pre-operative treatment with insulin: No  Pre-operative creatinine  ">2 mg/dL: No    History of congestive heart failure: No    [facility] Activity Status Index (DASI):   DASI Total Score: 23.45  METs: 5.6        ROS: No TIA's or unusual headaches, no dysphagia.  No prolonged cough. No dyspnea or chest pain on exertion.  No abdominal pain, change in bowel habits, black or bloody stools.  No urinary tract or BPH symptoms.  Positive reported pain in arthritic joint. Positive difficulty with gait. No skin rashes or issues.      Objective:    /70   Pulse 70   Ht 5' 4\" (1.626 m)   Wt 67.1 kg (148 lb)   SpO2 98%   BMI 25.40 kg/m²       General Appearance: no distress, conversive  HEENT: PERRLA, conjuctiva normal; oropharynx clear; mucous membranes moist;   Neck:  Supple, no lymphadenopathy or thyromegaly  Lungs: breath sounds are decreased throughout,  normal respiratory effort, no retractions, expiratory effort normal  CV: normal heart sounds S1/S2, PMI normal   ABD: soft non tender, no masses , no hepatic or splenomegaly  EXT: DP pulses intact, no lymphadenopathy, no edema  Skin: normal turgor, normal texture, no rash  Psych: affect normal, mood normal  Neuro: AAOx3        The following portions of the patient's history were reviewed and updated as appropriate: allergies, current medications, past family history, past medical history, past social history, past surgical history and problem list.     Past History:       Past Medical History:   Diagnosis Date    Arthritis     Hypertension     Past Surgical History:   Procedure Laterality Date    BREAST BIOPSY Left     ENDOMETRIAL ABLATION      EXPLORATORY LAPAROTOMY W/ BOWEL RESECTION N/A 07/06/2022    Procedure: LAPAROTOMY EXPLORATORY, LYSIS OF ADHESIONS;  Surgeon: Anibal Souza DO;  Location: [facility];  Service: General    TUBAL LIGATION            Social History     Tobacco Use    Smoking status: Every Day     Current packs/day: 0.50     Types: Cigarettes    Smokeless tobacco: Never   Vaping Use    Vaping status: Never Used " "  Substance Use Topics    Alcohol use: Yes     Comment: rarely, not even weekly    Drug use: Not Currently     Family History   Problem Relation Age of Onset    Uterine cancer Mother 36    Breast cancer Cousin 55    Breast cancer Cousin         age unknown    Breast cancer Paternal Aunt 52          Allergies:     No Known Allergies     Current Medications:     Current Outpatient Medications   Medication Instructions    acetaminophen (TYLENOL) 650 mg, Oral, Every 4 hours PRN    amLODIPine-benazepril (LOTREL 5-10) 5-10 MG per capsule     ascorbic acid (VITAMIN C) 500 mg, Oral, 2 times daily    atorvastatin (LIPITOR) 20 mg tablet     ferrous sulfate 324 mg, Oral, Every other day    fluticasone (FLONASE) 50 mcg/act nasal spray 1 spray, Nasal, Daily    folic acid (FOLVITE) 1 mg, Oral, Daily, Start 30 days prior to surgery    Multiple Vitamin (Daily-Rodrigo Multivitamin) TABS 1 tablet, Oral, Daily    mupirocin (BACTROBAN) 2 % ointment Topical, Daily           PRE-OP WORKSHEET DATA    Assessment of Pre-Operative Risks     MLJ Quality Hard Stops:    BMI (<40) : Estimated body mass index is 25.4 kg/m² as calculated from the following:    Height as of this encounter: 5' 4\" (1.626 m).    Weight as of this encounter: 67.1 kg (148 lb).    Hgb ( >11):   Lab Results   Component Value Date    HGB 14.0 11/16/2024    HGB 13.8 10/09/2024    HGB 13.9 07/07/2022       HbA1c (<7.5) :   Lab Results   Component Value Date    HGBA1C 5.8 (H) 11/16/2024       GFR (>60) (Less then 45 = Nephrology consult):    Lab Results   Component Value Date    EGFR 89 11/16/2024    EGFR 92 10/09/2024    EGFR 84 12/14/2023            Pre-Op Data Reviewed:       Laboratory Results: I have personally reviewed the pertinent laboratory results/reports     EKG:I have personally reviewed pertinent reports.  . I personally reviewed and interpreted available tracings in the electronic medical record    Encounter Date: 11/16/24   ECG 12 lead   Result Value    " Ventricular Rate 75    Atrial Rate 75    WI Interval 140    QRSD Interval 88    QT Interval 394    QTC Interval 441    P Axis 77    QRS Axis -12    T Wave Axis 55    Narrative    Normal sinus rhythm  Nonspecific ST and T wave abnormality  Abnormal ECG  When compared with ECG of 09-Oct-2024 08:39,  Nonspecific T wave abnormality, worse in Inferior leads  Nonspecific T wave abnormality now evident in Lateral leads  Confirmed by Rakesh Osborne ([id number]) on 11/18/2024 9:34:04 PM       OLD RECORDS: reviewed old records in the chart review section if EHR on day of visit.    Previous cardiopulmonary studies within the past year:  Echocardiogram: no   Cardiac Catheterization: no  Stress Test: no      Time of visit including pre-visit chart review, visit and post-visit coordination of plan and care , review of pre-surgical lab work, preparation and time spent documenting note in electronic medical record, time spent face-to-face in physical examination answering patient questions by care team 35 minutes             [facility]

## 2024-11-12 NOTE — TELEPHONE ENCOUNTER
----- Message from Madeline MATTSON sent at 11/12/2024  7:47 AM EST -----  Patient stopped in to drop off new forms for [employer] to be completed.  She requested you to call her in reference to her up coming surgery and tests that need to be done.

## 2024-11-14 ENCOUNTER — APPOINTMENT (OUTPATIENT)
Dept: PHYSICAL THERAPY | Facility: CLINIC | Age: 64
End: 2024-11-14
Payer: COMMERCIAL

## 2024-11-16 ENCOUNTER — APPOINTMENT (OUTPATIENT)
Dept: LAB | Age: 64
End: 2024-11-16
Payer: COMMERCIAL

## 2024-11-16 ENCOUNTER — OFFICE VISIT (OUTPATIENT)
Dept: LAB | Age: 64
End: 2024-11-16
Payer: COMMERCIAL

## 2024-11-16 DIAGNOSIS — M17.11 PRIMARY OSTEOARTHRITIS OF RIGHT KNEE: ICD-10-CM

## 2024-11-16 DIAGNOSIS — M17.11 OSTEOARTHRITIS OF RIGHT KNEE, UNSPECIFIED OSTEOARTHRITIS TYPE: ICD-10-CM

## 2024-11-16 LAB
ALBUMIN SERPL BCG-MCNC: 4.4 G/DL (ref 3.5–5)
ALP SERPL-CCNC: 70 U/L (ref 34–104)
ALT SERPL W P-5'-P-CCNC: 24 U/L (ref 7–52)
ANION GAP SERPL CALCULATED.3IONS-SCNC: 8 MMOL/L (ref 4–13)
APTT PPP: 24 SECONDS (ref 23–34)
AST SERPL W P-5'-P-CCNC: 24 U/L (ref 13–39)
BASOPHILS # BLD AUTO: 0.07 THOUSANDS/ÂΜL (ref 0–0.1)
BASOPHILS NFR BLD AUTO: 1 % (ref 0–1)
BILIRUB SERPL-MCNC: 0.43 MG/DL (ref 0.2–1)
BUN SERPL-MCNC: 20 MG/DL (ref 5–25)
CALCIUM SERPL-MCNC: 9.6 MG/DL (ref 8.4–10.2)
CHLORIDE SERPL-SCNC: 104 MMOL/L (ref 96–108)
CO2 SERPL-SCNC: 28 MMOL/L (ref 21–32)
CREAT SERPL-MCNC: 0.72 MG/DL (ref 0.6–1.3)
EOSINOPHIL # BLD AUTO: 0.21 THOUSAND/ÂΜL (ref 0–0.61)
EOSINOPHIL NFR BLD AUTO: 4 % (ref 0–6)
ERYTHROCYTE [DISTWIDTH] IN BLOOD BY AUTOMATED COUNT: 12.1 % (ref 11.6–15.1)
EST. AVERAGE GLUCOSE BLD GHB EST-MCNC: 120 MG/DL
GFR SERPL CREATININE-BSD FRML MDRD: 89 ML/MIN/1.73SQ M
GLUCOSE P FAST SERPL-MCNC: 94 MG/DL (ref 65–99)
HBA1C MFR BLD: 5.8 %
HCT VFR BLD AUTO: 43.1 % (ref 34.8–46.1)
HGB BLD-MCNC: 14 G/DL (ref 11.5–15.4)
IMM GRANULOCYTES # BLD AUTO: 0.01 THOUSAND/UL (ref 0–0.2)
IMM GRANULOCYTES NFR BLD AUTO: 0 % (ref 0–2)
INR PPP: 0.85 (ref 0.85–1.19)
LYMPHOCYTES # BLD AUTO: 2.14 THOUSANDS/ÂΜL (ref 0.6–4.47)
LYMPHOCYTES NFR BLD AUTO: 37 % (ref 14–44)
MCH RBC QN AUTO: 31.7 PG (ref 26.8–34.3)
MCHC RBC AUTO-ENTMCNC: 32.5 G/DL (ref 31.4–37.4)
MCV RBC AUTO: 98 FL (ref 82–98)
MONOCYTES # BLD AUTO: 0.45 THOUSAND/ÂΜL (ref 0.17–1.22)
MONOCYTES NFR BLD AUTO: 8 % (ref 4–12)
NEUTROPHILS # BLD AUTO: 2.9 THOUSANDS/ÂΜL (ref 1.85–7.62)
NEUTS SEG NFR BLD AUTO: 50 % (ref 43–75)
NRBC BLD AUTO-RTO: 0 /100 WBCS
PLATELET # BLD AUTO: 229 THOUSANDS/UL (ref 149–390)
PMV BLD AUTO: 9.6 FL (ref 8.9–12.7)
POTASSIUM SERPL-SCNC: 5.1 MMOL/L (ref 3.5–5.3)
PROT SERPL-MCNC: 6.6 G/DL (ref 6.4–8.4)
PROTHROMBIN TIME: 11.9 SECONDS (ref 12.3–15)
RBC # BLD AUTO: 4.42 MILLION/UL (ref 3.81–5.12)
SODIUM SERPL-SCNC: 140 MMOL/L (ref 135–147)
WBC # BLD AUTO: 5.78 THOUSAND/UL (ref 4.31–10.16)

## 2024-11-16 PROCEDURE — 85025 COMPLETE CBC W/AUTO DIFF WBC: CPT

## 2024-11-16 PROCEDURE — 85610 PROTHROMBIN TIME: CPT

## 2024-11-16 PROCEDURE — 80053 COMPREHEN METABOLIC PANEL: CPT

## 2024-11-16 PROCEDURE — 83036 HEMOGLOBIN GLYCOSYLATED A1C: CPT

## 2024-11-16 PROCEDURE — 86901 BLOOD TYPING SEROLOGIC RH(D): CPT

## 2024-11-16 PROCEDURE — 85730 THROMBOPLASTIN TIME PARTIAL: CPT

## 2024-11-16 PROCEDURE — 93005 ELECTROCARDIOGRAM TRACING: CPT

## 2024-11-16 PROCEDURE — 36415 COLL VENOUS BLD VENIPUNCTURE: CPT

## 2024-11-16 PROCEDURE — 87081 CULTURE SCREEN ONLY: CPT

## 2024-11-16 PROCEDURE — 86900 BLOOD TYPING SEROLOGIC ABO: CPT

## 2024-11-16 PROCEDURE — 86850 RBC ANTIBODY SCREEN: CPT

## 2024-11-17 ENCOUNTER — LAB REQUISITION (OUTPATIENT)
Dept: LAB | Facility: HOSPITAL | Age: 64
End: 2024-11-17
Payer: COMMERCIAL

## 2024-11-17 DIAGNOSIS — M17.11 UNILATERAL PRIMARY OSTEOARTHRITIS, RIGHT KNEE: ICD-10-CM

## 2024-11-17 LAB
ABO GROUP BLD: NORMAL
BLD GP AB SCN SERPL QL: NEGATIVE
MRSA NOSE QL CULT: NORMAL
RH BLD: POSITIVE
SPECIMEN EXPIRATION DATE: NORMAL

## 2024-11-18 LAB
ATRIAL RATE: 75 BPM
P AXIS: 77 DEGREES
PR INTERVAL: 140 MS
QRS AXIS: -12 DEGREES
QRSD INTERVAL: 88 MS
QT INTERVAL: 394 MS
QTC INTERVAL: 441 MS
T WAVE AXIS: 55 DEGREES
VENTRICULAR RATE: 75 BPM

## 2024-11-18 PROCEDURE — 93010 ELECTROCARDIOGRAM REPORT: CPT | Performed by: INTERNAL MEDICINE

## 2024-11-19 ENCOUNTER — APPOINTMENT (OUTPATIENT)
Dept: PHYSICAL THERAPY | Facility: CLINIC | Age: 64
End: 2024-11-19
Payer: COMMERCIAL

## 2024-11-20 ENCOUNTER — ANESTHESIA (OUTPATIENT)
Age: 64
End: 2024-11-20

## 2024-11-20 ENCOUNTER — ANESTHESIA EVENT (OUTPATIENT)
Age: 64
End: 2024-11-20

## 2024-11-22 ENCOUNTER — OFFICE VISIT (OUTPATIENT)
Age: 64
End: 2024-11-22
Payer: COMMERCIAL

## 2024-11-22 VITALS
WEIGHT: 148 LBS | HEIGHT: 64 IN | DIASTOLIC BLOOD PRESSURE: 70 MMHG | BODY MASS INDEX: 25.27 KG/M2 | OXYGEN SATURATION: 98 % | HEART RATE: 70 BPM | SYSTOLIC BLOOD PRESSURE: 132 MMHG

## 2024-11-22 DIAGNOSIS — Z72.0 TOBACCO USE: ICD-10-CM

## 2024-11-22 DIAGNOSIS — I10 PRIMARY HYPERTENSION: ICD-10-CM

## 2024-11-22 DIAGNOSIS — Z01.818 PREOPERATIVE CLEARANCE: Primary | ICD-10-CM

## 2024-11-22 DIAGNOSIS — M17.11 PRIMARY OSTEOARTHRITIS OF RIGHT KNEE: ICD-10-CM

## 2024-11-22 PROCEDURE — 99215 OFFICE O/P EST HI 40 MIN: CPT | Performed by: NURSE PRACTITIONER

## 2024-11-22 NOTE — ASSESSMENT & PLAN NOTE
Smokes 1/2 ppd  Not interested in quitting or referral to lifestyle management  She is going to attempt to quit leading to surgery and maintain until after incision is healed  She is aware of risk of poor healing d/t same

## 2024-11-26 ENCOUNTER — OCCMED (OUTPATIENT)
Dept: URGENT CARE | Facility: MEDICAL CENTER | Age: 64
End: 2024-11-26

## 2024-11-26 DIAGNOSIS — Z02.89 ENCOUNTER FOR PHYSICAL EXAMINATION RELATED TO EMPLOYMENT: Primary | ICD-10-CM

## 2024-12-03 RX ORDER — ACETAMINOPHEN 500 MG
1000 TABLET ORAL EVERY 8 HOURS PRN
Qty: 84 TABLET | Refills: 0 | Status: SHIPPED | OUTPATIENT
Start: 2024-12-03 | End: 2024-12-17

## 2024-12-03 RX ORDER — ASPIRIN 81 MG/1
81 TABLET ORAL 2 TIMES DAILY
Qty: 84 TABLET | Refills: 0 | Status: SHIPPED | OUTPATIENT
Start: 2024-12-03 | End: 2025-01-14

## 2024-12-03 RX ORDER — OXYCODONE HYDROCHLORIDE 5 MG/1
5 TABLET ORAL EVERY 4 HOURS PRN
Qty: 30 TABLET | Refills: 0 | Status: SHIPPED | OUTPATIENT
Start: 2024-12-03

## 2024-12-03 RX ORDER — CELECOXIB 200 MG/1
200 CAPSULE ORAL 2 TIMES DAILY
Qty: 60 CAPSULE | Refills: 0 | Status: SHIPPED | OUTPATIENT
Start: 2024-12-03 | End: 2025-01-02

## 2024-12-03 NOTE — PRE-PROCEDURE INSTRUCTIONS
Pre-Surgery Instructions:   Medication Instructions    acetaminophen (TYLENOL) 325 mg tablet Uses PRN- OK to take day of surgery    amLODIPine-benazepril (LOTREL 5-10) 5-10 MG per capsule Hold day of surgery.    ascorbic acid (VITAMIN C) 500 MG tablet Instructions provided by MD    atorvastatin (LIPITOR) 20 mg tablet Take night before surgery    ferrous sulfate 324 (65 Fe) mg Instructions provided by MD    fluticasone (FLONASE) 50 mcg/act nasal spray Uses PRN- OK to take day of surgery    folic acid (FOLVITE) 1 mg tablet Instructions provided by MD    Multiple Vitamin (Daily-Rodrigo Multivitamin) TABS Instructions provided by MD    mupirocin (BACTROBAN) 2 % ointment Take day of surgery.    [DISCONTINUED] amLODIPine (NORVASC) 5 mg tablet Not aking    [DISCONTINUED] ferrous sulfate 324 (65 Fe) mg Instructions provided by MD    [DISCONTINUED] folic acid (FOLVITE) 1 mg tablet Instructions provided by MD    [DISCONTINUED] meloxicam (MOBIC) 15 mg tablet Not taking    [DISCONTINUED] Multiple Vitamin (Daily-Rodrigo Multivitamin) TABS Instructions provided by MD    Medication instructions for day surgery reviewed. Please use only a sip of water to take your instructed medications. Avoid all over the counter vitamins, supplements and NSAIDS for one week prior to surgery per anesthesia guidelines. Tylenol is ok to take as needed.     You will receive a call one business day prior to surgery with an arrival time and hospital directions. If your surgery is scheduled on a Monday, the hospital will be calling you on the Friday prior to your surgery. If you have not heard from anyone by 8pm, please call the hospital supervisor through the hospital  at [phone]. ([place] [phone] or [place] [phone]).    Do not eat or drink anything after midnight the night before your surgery, including candy, mints, lifesavers, or chewing gum. Do not drink alcohol 24hrs before your surgery. Try not to smoke at least 24hrs before  "your surgery.       Follow the pre surgery showering instructions as listed in the “My Surgical Experience Booklet” or otherwise provided by your surgeon's office. Do not use a blade to shave the surgical area 1 week before surgery. It is okay to use a clean electric clippers up to 24 hours before surgery. Do not apply any lotions, creams, including makeup, cologne, deodorant, or perfumes after showering on the day of your surgery. Do not use dry shampoo, hair spray, hair gel, or any type of hair products.     No contact lenses, eye make-up, or artificial eyelashes. Remove nail polish, including gel polish, and any artificial, gel, or acrylic nails if possible. Remove all jewelry including rings and body piercing jewelry.     Wear causal clothing that is easy to take on and off. Consider your type of surgery.    Keep any valuables, jewelry, piercings at home. Please bring any specially ordered equipment (sling, braces) if indicated.    Arrange for a responsible person to drive you to and from the hospital on the day of your surgery. Please confirm the visitor policy for the day of your procedure when you receive your phone call with an arrival time.     Call the surgeon's office with any new illnesses, exposures, or additional questions prior to surgery.    Please reference your “My Surgical Experience Booklet” for additional information to prepare for your upcoming surgery.    Reviewed with patient, in detail, instructions from \"My Surgical Experience\". Verified with patient that he received TMLJ patient education from surgeon's office. Advised to call ortho office/surgery coordinator with any questions and/or concerns.   Confirmed that patient has hibiclens soap and CHG wipes. Incentive spirometer received.   Patient verbalized understanding of current visitor restrictions due to Covid and will clarify with nurse DOS. Instructed to avoid all ASA and OTC Vit/Supp 1 week prior to surgery and to avoid NSAIDs 7 days " prior to surgery per anesthesia guidelines.Tylenol ok to take prn.   No alcohol 24 hours prior to surgery. Patient aware Lovenox or other Blood Thinner prescribed is for POST OP ONLY. Instructed to call surgeon's office in meantime with any new illness.  Patient verbalized an understanding of all instructions reviewed and offers no concerns at this time.

## 2024-12-04 ENCOUNTER — APPOINTMENT (OUTPATIENT)
Age: 64
End: 2024-12-04
Payer: COMMERCIAL

## 2024-12-04 ENCOUNTER — HOSPITAL ENCOUNTER (OUTPATIENT)
Age: 64
Setting detail: OUTPATIENT SURGERY
Discharge: HOME | End: 2024-12-04
Attending: STUDENT IN AN ORGANIZED HEALTH CARE EDUCATION/TRAINING PROGRAM | Admitting: STUDENT IN AN ORGANIZED HEALTH CARE EDUCATION/TRAINING PROGRAM
Payer: COMMERCIAL

## 2024-12-04 VITALS
HEART RATE: 72 BPM | SYSTOLIC BLOOD PRESSURE: 127 MMHG | TEMPERATURE: 97.6 F | HEIGHT: 63 IN | WEIGHT: 144.6 LBS | BODY MASS INDEX: 25.62 KG/M2 | DIASTOLIC BLOOD PRESSURE: 60 MMHG | OXYGEN SATURATION: 99 % | RESPIRATION RATE: 16 BRPM

## 2024-12-04 DIAGNOSIS — M17.11 PRIMARY OSTEOARTHRITIS OF RIGHT KNEE: Primary | ICD-10-CM

## 2024-12-04 PROCEDURE — NC001 PR NO CHARGE

## 2024-12-04 PROCEDURE — 73560 X-RAY EXAM OF KNEE 1 OR 2: CPT

## 2024-12-04 PROCEDURE — C1713 ANCHOR/SCREW BN/BN,TIS/BN: HCPCS | Performed by: STUDENT IN AN ORGANIZED HEALTH CARE EDUCATION/TRAINING PROGRAM

## 2024-12-04 PROCEDURE — 27446 REVISION OF KNEE JOINT: CPT | Performed by: STUDENT IN AN ORGANIZED HEALTH CARE EDUCATION/TRAINING PROGRAM

## 2024-12-04 PROCEDURE — 97167 OT EVAL HIGH COMPLEX 60 MIN: CPT

## 2024-12-04 PROCEDURE — 97163 PT EVAL HIGH COMPLEX 45 MIN: CPT | Performed by: PHYSICAL THERAPIST

## 2024-12-04 PROCEDURE — C1776 JOINT DEVICE (IMPLANTABLE): HCPCS | Performed by: STUDENT IN AN ORGANIZED HEALTH CARE EDUCATION/TRAINING PROGRAM

## 2024-12-04 PROCEDURE — 27446 REVISION OF KNEE JOINT: CPT

## 2024-12-04 PROCEDURE — C9290 INJ, BUPIVACAINE LIPOSOME: HCPCS | Performed by: STUDENT IN AN ORGANIZED HEALTH CARE EDUCATION/TRAINING PROGRAM

## 2024-12-04 DEVICE — SMARTSET HIGH PERFORMANCE MV MEDIUM VISCOSITY BONE CEMENT 40G
Type: IMPLANTABLE DEVICE | Site: KNEE | Status: FUNCTIONAL
Brand: SMARTSET

## 2024-12-04 DEVICE — TIBIAL TRAY FIX CEMENTED S2 RM
Type: IMPLANTABLE DEVICE | Site: KNEE | Status: FUNCTIONAL
Brand: MOTO PARTIAL KNEE SYSTEM - MEDIAL

## 2024-12-04 DEVICE — E-CROSS TIBIAL INSERT FIX S2 RM - 9MM
Type: IMPLANTABLE DEVICE | Site: KNEE | Status: FUNCTIONAL
Brand: MOTO PARTIAL KNEE SYSTEM

## 2024-12-04 RX ORDER — HYDROMORPHONE HCL/PF 1 MG/ML
0.5 SYRINGE (ML) INJECTION
Status: DISCONTINUED | OUTPATIENT
Start: 2024-12-04 | End: 2024-12-04 | Stop reason: HOSPADM

## 2024-12-04 RX ORDER — OXYCODONE HYDROCHLORIDE 5 MG/1
5 TABLET ORAL EVERY 4 HOURS PRN
Status: DISCONTINUED | OUTPATIENT
Start: 2024-12-04 | End: 2024-12-04 | Stop reason: HOSPADM

## 2024-12-04 RX ORDER — ACETAMINOPHEN 325 MG/1
650 TABLET ORAL EVERY 6 HOURS PRN
Status: DISCONTINUED | OUTPATIENT
Start: 2024-12-04 | End: 2024-12-04 | Stop reason: HOSPADM

## 2024-12-04 RX ORDER — ACETAMINOPHEN 325 MG/1
975 TABLET ORAL ONCE
Status: COMPLETED | OUTPATIENT
Start: 2024-12-04 | End: 2024-12-04

## 2024-12-04 RX ORDER — CALCIUM CARBONATE 500 MG/1
1000 TABLET, CHEWABLE ORAL DAILY PRN
Status: DISCONTINUED | OUTPATIENT
Start: 2024-12-04 | End: 2024-12-04 | Stop reason: HOSPADM

## 2024-12-04 RX ORDER — CEFAZOLIN SODIUM 1 G/50ML
1000 SOLUTION INTRAVENOUS EVERY 8 HOURS
Status: DISCONTINUED | OUTPATIENT
Start: 2024-12-04 | End: 2024-12-04 | Stop reason: HOSPADM

## 2024-12-04 RX ORDER — SODIUM CHLORIDE, SODIUM LACTATE, POTASSIUM CHLORIDE, CALCIUM CHLORIDE 600; 310; 30; 20 MG/100ML; MG/100ML; MG/100ML; MG/100ML
125 INJECTION, SOLUTION INTRAVENOUS CONTINUOUS
Status: DISCONTINUED | OUTPATIENT
Start: 2024-12-04 | End: 2024-12-04 | Stop reason: HOSPADM

## 2024-12-04 RX ORDER — MAGNESIUM HYDROXIDE/ALUMINUM HYDROXICE/SIMETHICONE 120; 1200; 1200 MG/30ML; MG/30ML; MG/30ML
30 SUSPENSION ORAL EVERY 6 HOURS PRN
Status: DISCONTINUED | OUTPATIENT
Start: 2024-12-04 | End: 2024-12-04 | Stop reason: HOSPADM

## 2024-12-04 RX ORDER — ONDANSETRON 2 MG/ML
4 INJECTION INTRAMUSCULAR; INTRAVENOUS EVERY 6 HOURS PRN
Status: DISCONTINUED | OUTPATIENT
Start: 2024-12-04 | End: 2024-12-04 | Stop reason: HOSPADM

## 2024-12-04 RX ORDER — MIDAZOLAM HYDROCHLORIDE 2 MG/2ML
INJECTION, SOLUTION INTRAMUSCULAR; INTRAVENOUS
Status: COMPLETED | OUTPATIENT
Start: 2024-12-04 | End: 2024-12-04

## 2024-12-04 RX ORDER — ONDANSETRON 2 MG/ML
INJECTION INTRAMUSCULAR; INTRAVENOUS AS NEEDED
Status: DISCONTINUED | OUTPATIENT
Start: 2024-12-04 | End: 2024-12-04

## 2024-12-04 RX ORDER — SENNOSIDES 8.6 MG
1 TABLET ORAL DAILY
Status: DISCONTINUED | OUTPATIENT
Start: 2024-12-04 | End: 2024-12-04 | Stop reason: HOSPADM

## 2024-12-04 RX ORDER — SODIUM CHLORIDE, SODIUM LACTATE, POTASSIUM CHLORIDE, CALCIUM CHLORIDE 600; 310; 30; 20 MG/100ML; MG/100ML; MG/100ML; MG/100ML
1.5 INJECTION, SOLUTION INTRAVENOUS CONTINUOUS
Status: DISCONTINUED | OUTPATIENT
Start: 2024-12-04 | End: 2024-12-04 | Stop reason: HOSPADM

## 2024-12-04 RX ORDER — FENTANYL CITRATE/PF 50 MCG/ML
50 SYRINGE (ML) INJECTION
Status: DISCONTINUED | OUTPATIENT
Start: 2024-12-04 | End: 2024-12-04 | Stop reason: HOSPADM

## 2024-12-04 RX ORDER — OXYCODONE HYDROCHLORIDE 10 MG/1
10 TABLET ORAL EVERY 4 HOURS PRN
Status: DISCONTINUED | OUTPATIENT
Start: 2024-12-04 | End: 2024-12-04 | Stop reason: HOSPADM

## 2024-12-04 RX ORDER — PROPOFOL 10 MG/ML
INJECTION, EMULSION INTRAVENOUS CONTINUOUS PRN
Status: DISCONTINUED | OUTPATIENT
Start: 2024-12-04 | End: 2024-12-04

## 2024-12-04 RX ORDER — BUPIVACAINE HYDROCHLORIDE 5 MG/ML
INJECTION, SOLUTION EPIDURAL; INTRACAUDAL
Status: COMPLETED | OUTPATIENT
Start: 2024-12-04 | End: 2024-12-04

## 2024-12-04 RX ORDER — DOCUSATE SODIUM 100 MG/1
100 CAPSULE, LIQUID FILLED ORAL 2 TIMES DAILY
Status: DISCONTINUED | OUTPATIENT
Start: 2024-12-04 | End: 2024-12-04 | Stop reason: HOSPADM

## 2024-12-04 RX ORDER — CHLORHEXIDINE GLUCONATE ORAL RINSE 1.2 MG/ML
15 SOLUTION DENTAL ONCE
Status: COMPLETED | OUTPATIENT
Start: 2024-12-04 | End: 2024-12-04

## 2024-12-04 RX ORDER — GLYCOPYRROLATE 0.2 MG/ML
INJECTION INTRAMUSCULAR; INTRAVENOUS AS NEEDED
Status: DISCONTINUED | OUTPATIENT
Start: 2024-12-04 | End: 2024-12-04

## 2024-12-04 RX ORDER — METOCLOPRAMIDE HYDROCHLORIDE 5 MG/ML
10 INJECTION INTRAMUSCULAR; INTRAVENOUS ONCE AS NEEDED
Status: DISCONTINUED | OUTPATIENT
Start: 2024-12-04 | End: 2024-12-04 | Stop reason: HOSPADM

## 2024-12-04 RX ORDER — TRANEXAMIC ACID 10 MG/ML
1000 INJECTION, SOLUTION INTRAVENOUS ONCE
Status: COMPLETED | OUTPATIENT
Start: 2024-12-04 | End: 2024-12-04

## 2024-12-04 RX ORDER — CEFAZOLIN SODIUM 2 G/50ML
2000 SOLUTION INTRAVENOUS ONCE
Status: COMPLETED | OUTPATIENT
Start: 2024-12-04 | End: 2024-12-04

## 2024-12-04 RX ORDER — MAGNESIUM HYDROXIDE 1200 MG/15ML
LIQUID ORAL AS NEEDED
Status: DISCONTINUED | OUTPATIENT
Start: 2024-12-04 | End: 2024-12-04 | Stop reason: HOSPADM

## 2024-12-04 RX ORDER — GABAPENTIN 300 MG/1
300 CAPSULE ORAL
Status: DISCONTINUED | OUTPATIENT
Start: 2024-12-04 | End: 2024-12-04 | Stop reason: HOSPADM

## 2024-12-04 RX ORDER — CHLORHEXIDINE GLUCONATE 40 MG/ML
SOLUTION TOPICAL DAILY PRN
Status: DISCONTINUED | OUTPATIENT
Start: 2024-12-04 | End: 2024-12-04 | Stop reason: HOSPADM

## 2024-12-04 RX ORDER — LIDOCAINE HYDROCHLORIDE 10 MG/ML
INJECTION, SOLUTION EPIDURAL; INFILTRATION; INTRACAUDAL; PERINEURAL AS NEEDED
Status: DISCONTINUED | OUTPATIENT
Start: 2024-12-04 | End: 2024-12-04

## 2024-12-04 RX ADMIN — TRANEXAMIC ACID 1000 MG: 10 INJECTION, SOLUTION INTRAVENOUS at 11:59

## 2024-12-04 RX ADMIN — CHLORHEXIDINE GLUCONATE 15 ML: 1.2 RINSE ORAL at 09:35

## 2024-12-04 RX ADMIN — LIDOCAINE HYDROCHLORIDE 50 MG: 10 SOLUTION INTRAVENOUS at 11:00

## 2024-12-04 RX ADMIN — MEPIVACAINE HYDROCHLORIDE 3 ML: 15 INJECTION, SOLUTION EPIDURAL; INFILTRATION at 10:59

## 2024-12-04 RX ADMIN — BUPIVACAINE HYDROCHLORIDE 10 ML: 5 INJECTION, SOLUTION EPIDURAL; INTRACAUDAL; PERINEURAL at 10:35

## 2024-12-04 RX ADMIN — BUPIVACAINE 20 ML: 13.3 INJECTION, SUSPENSION, LIPOSOMAL INFILTRATION at 10:35

## 2024-12-04 RX ADMIN — ACETAMINOPHEN 325MG 975 MG: 325 TABLET ORAL at 09:35

## 2024-12-04 RX ADMIN — MIDAZOLAM 2 MG: 1 INJECTION INTRAMUSCULAR; INTRAVENOUS at 10:30

## 2024-12-04 RX ADMIN — PHENYLEPHRINE HYDROCHLORIDE 20 MCG/MIN: 10 INJECTION INTRAVENOUS at 11:11

## 2024-12-04 RX ADMIN — SODIUM CHLORIDE, SODIUM LACTATE, POTASSIUM CHLORIDE, AND CALCIUM CHLORIDE 125 ML/HR: .6; .31; .03; .02 INJECTION, SOLUTION INTRAVENOUS at 09:35

## 2024-12-04 RX ADMIN — OXYCODONE 5 MG: 5 TABLET ORAL at 13:02

## 2024-12-04 RX ADMIN — GLYCOPYRROLATE 0.1 MG: 0.2 INJECTION, SOLUTION INTRAMUSCULAR; INTRAVENOUS at 11:24

## 2024-12-04 RX ADMIN — TRANEXAMIC ACID 1000 MG: 10 INJECTION, SOLUTION INTRAVENOUS at 11:04

## 2024-12-04 RX ADMIN — CEFAZOLIN SODIUM 2000 MG: 2 SOLUTION INTRAVENOUS at 11:04

## 2024-12-04 RX ADMIN — PROPOFOL 100 MCG/KG/MIN: 10 INJECTION, EMULSION INTRAVENOUS at 11:00

## 2024-12-04 RX ADMIN — ONDANSETRON 4 MG: 2 INJECTION INTRAMUSCULAR; INTRAVENOUS at 12:10

## 2024-12-04 NOTE — DISCHARGE INSTR - AVS FIRST PAGE
Lee Harrison MD  Adult Reconstruction Specialist   [facility]  Office Phone: [phone]    Discharge Instructions - Knee Replacement    What to Expect/Activity  You are weight bearing as tolerated to your operative leg with assistive devices.  Please use crutches/walker when ambulating as needed until your follow-up  Significant swelling and discomfort in the knee is normal for several days to weeks after surgery. You may use ice around the knee to help as desired. This can be used for 20-30 minutes every 1-2 hours  To optimally control swelling, your leg should be elevated above heart level as often as necessary. This can greatly improve post operative pain levels, due to uncontrolled swelling.   For the first several weeks after surgery, it is normal to experience redness, swelling, brusing and pain to the operative knee. This is generally not a sign of concern or an abnormal finding.    Post-operative Objectives  In effort to restore your knee range of motion, it is essential to immediately begin flexing your knee. Do so several times a day with the goal of achieving at or around 90 degrees by the time we see you 2 weeks post operatively.   There are no restrictions as to how often you can perform this knee motion. Your knee implant and incision are able to withstand these types of movement  You may utilize the physical therapy exercise packet provided to you by the physical therapy team. This should have been given to you during their initial assessment in the hospital.  If we feel it is appropriate, we will initiate formal physical therapy 2 weeks after surgery. Once we see you at the first post operative visit, we will decide if this is the best course of action and of benefit to you.   If you have any questions regarding the above objectives, please do not hesistate to call our office for clarification.        Dressing/Wound Care/Bathing  After surgery, your knee will be  wrapped in an ace wrap, toe to mid thigh, with a gray waterproof adhesive dressing underneath protecting the incision  You may remove your ace wrap 5 days after surgery and may be re-applied and/or tightened/loosened as necessary. I recommend to re-wrap your leg if swelling is an issue.   You may start showering 5 days after surgery. When drying off, please pat the dressing dry. If you notice the dressing appears saturated or is starting to come off, please over-wrap with dry dressing.  If you shower too early, there is a higher chance of infection and wound healing complications.  No baths, swimming or submerging until cleared by Dr. Harrison    Pain Management/Medications  You may resume your usual medications.  Please take the following medications:  Anti-coagulation (blood clot prevention) - Aspirin 81 mg, 1 tablet twice daily for 6 weeks  Antibiotics - none  Pain medication:  Narcotic Medication: Oxycodone 5 mg, 1 tablet every 4-6 hours as needed for severe pain  NSAID (Anti-inflammatory): Celebrex 200 mg, 1 tablet twice a day as needed for mild to moderate pain  Tylenol 1000mg up to three times daily as needed for mild to moderate pain. Do not exceed 3000mg daily   If you have questions or pain concerns, please contact the office. Pain medication cannot remove all post-operative pain    Follow up/Call if:  The findings of your surgery will be explained to you and your family immediately after surgery. However, in the post-operative period, during recovery from anesthesia you may not fully remember or fully understand what was said. This will be again gone over when you return for your post-op appointment.  Please contact Dr. Harrison's office if you experience the following:  Excessive bleeding (bleeding through your dressing)  Fever greater than 101 degrees F after 48 hours (low grade fevers the day or two after surgery are normal)  Persistent nausea or vomiting  Decreased sensation or discoloration of  the operative limb  Pain or swelling that is getting worse and not better with medication      Dr. Harrison's Office Contact: [phone]

## 2024-12-04 NOTE — PERIOPERATIVE NURSING NOTE
Patient prepared for discharge.   Seen by PT for evaluation and PT session completed.   Patient able to stand and transfer to  without any problem.   Assisted patient to her car and her right knee started to buckle.  Patient did not fall, but both  and I assisted her into the car.   I encouraged the patient to come back into our department so therapy can work with her some more.  She outright refused and was adamant about going home.  I reviewed safety with this family and importance of safe transfers especially with her walker.  She continued to decline the need to come back in for further assistance for physical therapy. I  made PT aware as well as Dr. Harrison regarding this.  I instructed both the patient and  to use caution with transfers and to contact the office if any further problems should occur.   They both verbalized understanding of same.

## 2024-12-04 NOTE — ANESTHESIA POSTPROCEDURE EVALUATION
Post-Op Assessment Note    CV Status:  Stable    Pain management: adequate       Mental Status:  Alert and awake   Hydration Status:  Euvolemic   PONV Controlled:  Controlled   Airway Patency:  Patent     Post Op Vitals Reviewed: Yes    No anethesia notable event occurred.            Last Filed PACU Vitals:  Vitals Value Taken Time   Temp 97.6 °F (36.4 °C) 12/04/24 1246   Pulse 72 12/04/24 1246   /63 12/04/24 1246   Resp 20 12/04/24 1246   SpO2 96 % 12/04/24 1246       Modified Terell:  Activity: 2 (12/4/2024 12:45 PM)  Respiration: 2 (12/4/2024 12:45 PM)  Circulation: 2 (12/4/2024 12:45 PM)  Consciousness: 2 (12/4/2024 12:45 PM)  Oxygen Saturation: 2 (12/4/2024 12:45 PM)  Modified Terell Score: 10 (12/4/2024 12:45 PM)

## 2024-12-04 NOTE — ANESTHESIA PROCEDURE NOTES
Spinal Block    Patient location during procedure: OR  Start time: 12/4/2024 10:59 AM  Reason for block: procedure for pain and at surgeon's request  Staffing  Performed by: Anca Arias CRNA  Authorized by: Mak Uriostegui MD    Preanesthetic Checklist  Completed: patient identified, IV checked, risks and benefits discussed, surgical consent, monitors and equipment checked, pre-op evaluation and timeout performed  Spinal Block  Patient position: sitting  Prep: ChloraPrep and site prepped and draped  Patient monitoring: frequent blood pressure checks, continuous pulse ox and heart rate  Approach: midline  Location: L3-4  Needle  Needle type: Pencan   Needle gauge: 24 G  Needle length: 4 in  Assessment  Sensory level: T10  Injection Assessment:  negative aspiration for heme, no paresthesia on injection and positive aspiration for clear CSF.  Post-procedure:  site cleaned

## 2024-12-04 NOTE — OP NOTE
OPERATIVE REPORT  PATIENT NAME: Diane Pike  : 1960  MRN: [id number]  Pt Location:  [facility] OR ROOM 03    Surgery Date: 2024    Surgeons and Role:     * Lee Harrison MD - Primary     * Alejandro Haji PA-C - Assisting     * Orlando Street PA-C - Assisting     Preop Diagnosis:  Primary osteoarthritis of right knee [M17.11]    Post-Op Diagnosis Codes:     * Primary osteoarthritis of right knee [M17.11]    Procedure(s):  Right - ARTHROPLASTY KNEE UNICOMPARTMENTAL. RIGHT;SAME DAY (CPT 96851)    Specimens:  * No specimens in log *    Estimated Blood Loss:   Minimal    Drains:  * No LDAs found *    Anesthesia Type:   Spinal     Operative Indications:  Primary osteoarthritis of right knee [M17.11]  See clinic note for complete list of indications    Operative Findings:  Full-thickness chondral wear to the medial compartment  Well-preserved lateral and patellofemoral compartments    Complications:   None    Knee Approach: Sub-vastus    Chronic Narcotic Use:  No      Procedure and Technique:  Implants:   Medacta Motomedial distal femur, size 3  Medacta Motomedial proximal tibia, size 2  Medacta Amalia polyethylene liner, 9mm      After appropriate anesthesia was induced, the patient was placed on the operating room table and all bony prominences were padded.  An upper thigh tourniquet was placed and the right lower extremity was prepped and draped in the usual sterile fashion.  A timeout was performed to confirm patient, laterality, procedure and implants.  All were in agreement the procedure began.     The limb was elevated for exsanguination and the tourniquet was inflated to 250 mmHg. A #10 scalpel was used to make a midline incision sharply to the anterior aspect of the knee down to the level of extensor mechanism.  Metzenbaum gurvinder were then utilized to create a medial and small lateral full-thickness skin flap.  Metzenbaum gurvinder were again used to enter the crural fascia of the vastus medialis muscle  compartment.  The muscle was bluntly dissected off of the intermuscular septum and retracted off of the underlying capsule with a double angled Hohmann.  The knee was then brought to mid flexion and a new #10 scalpel was utilized to perform a subvastus approach.      The lateral and patellofemoral compartments of the knee were visualized and found to have no wear.  The decision was made to proceed with medial unicompartmental knee arthroplasty.  An extramedullary tibial cutting guide was placed in the appropriate coronal, sagittal and depth planes.  Reciprocating an oscillating saw was then utilized to create the proximal tibial cut being sure to protect the patellar tendon and MCL.  The excised proximal tibial bone was then removed.  A spacer block was used to confirm our gap and then the distal femur cutting guide was inserted with the knee in extension.  Once pinned in place, an oscillating saw was used to perform the distal femur cut.  The knee was then brought into flexion and the 2-in-1 cutting block was then applied to the distal femur.  The posterior condyles and posterior chamfer cuts were then made with an oscillating saw and the lug holes were drilled.     The tibia was sized and the medial meniscus was excised with electrocautery.  A tibial baseplate was impacted in place followed by a distal femur trial and polyethylene liner.  The knee was brought through range of motion and found to have excellent stability and tracking.  The trials were removed, local anesthesia was administered to the pericapsular tissues and the knee was thoroughly irrigated with sterile saline.  Once the cup bony surfaces were thoroughly dried, cement was pressurized into the proximal tibia and the proximal tibial baseplate was impacted into place.  The process was repeated with the distal femur component.  A polyethylene liner was then impacted into place and excess cement was removed.     The knee was then irrigated with sterile  saline and we began the process of closure.  The joint capsule was approximated with a #1 Vicryl suture in interrupted fashion followed by a #1 strata fix suture.  Subdermal tissues were closed with 2-0 Vicryl in interrupted fashion and the skin was closed with a 3-0 Monocryl in a running fashion.  A glue and mesh dressing was applied to the skin followed by 4 x 4 gauze, ABD pad, Webril and Ace bandage.  Anesthesia was discontinued and the patient was taken to the PACU in stable condition.     No competent resident was available to assist in the case. Dylon Street PA-c was necessary for safe positioning, retraction and closure.  I was present for the entirety of the case         SIGNATURE: Lee Harrison MD  DATE: December 4, 2024  TIME: 12:26 PM

## 2024-12-04 NOTE — PLAN OF CARE
Problem: OCCUPATIONAL THERAPY ADULT  Goal: Performs self-care activities at highest level of function for planned discharge setting.  See evaluation for individualized goals.  Description: Treatment Interventions: ADL retraining, Functional transfer training, Endurance training, Patient/family training, Equipment evaluation/education, Compensatory technique education, Continued evaluation, Activityengagement, Energy conservation          See flowsheet documentation for full assessment, interventions and recommendations.   12/4/2024 1606 by Jodie Recio OT  Note: Limitation: Decreased ADL status, Decreased endurance, Decreased self-care trans, Decreased high-level ADLs  Prognosis: Good  Assessment: Pt is a 63 y.o. female seen for OT evaluation s/p adm to [facility] on 12/4/2024 w/ Primary osteoarthritis of right knee . Comorbidities affecting pt’s functional performance include a significant PMH of arthritis, hyperlipidemia, HTN. Pt with active OT orders and activity orders for Activity beginning POD #0. WBAT RLE. Pt lives with spouse in a multilevel house with 2 RENETTA. Pt has first floor bathroom. At baseline, pt was independent with all ADLs/IADLs. Pt completed supine to sit with supervision. Pt completed don of underwear and pants with supervision, then standing to pull over waist with use of RW for stability. Pt completed don of shirt with supervision. Pt completed sit to stand with minAx1 due to RLE knee buckling. Then progressing to supervision with use of RW. Verbal cues for leg extension and proper technique. Pt progressing to supervision with transfers and no knee buckling noted. Pt completed functional mobility functional household distance with use of RW and minAx1 progressing to supervision, no knee buckling noted. Pt comfortable DC'ing home, declined staying overnight. RN aware. Upon evaluation, pt currently requires S for UB ADLs, S for LB ADLs, S for toileting, S for bed mobility, S for functional  mobility, and S for transfers 2* the following deficits impacting occupational performance: weakness, decreased strength , decreased balance, decreased activity tolerance, increased pain, and orthopedic restrictions. These impairments, as well at pt’s personal factors of: RENETTA home environment, steps within home environment, difficulty performing ADLs, difficulty performing IADLs, difficulty performing transfers/mobility, WBS, fall risk , and new use of AD for functional transfers/mobility limit pt’s ability to safely engage in all baseline areas of occupation. Based on the aforementioned OT evaluation, functional performance deficits, and assessments, pt has been identified as a high complexity evaluation. Pt to continue to benefit from continued acute OT services during hospital stay to address defined deficits and to maximize level of functional independence in the following Occupational Performance areas: grooming, bathing/shower, toilet hygiene, dressing, health maintenance, functional mobility, community mobility, clothing management, cleaning, household maintenance, and job performance/volunteering. From OT standpoint, recommend No post-acute rehabilitation needs upon D/C. OT will continue to follow pt 3-5x/wk.     Rehab Resource Intensity Level, OT: No post-acute rehabilitation needs       12/4/2024 1606 by Jodie Recio OT  Note: Limitation: Decreased ADL status, Decreased endurance, Decreased self-care trans, Decreased high-level ADLs  Prognosis: Good  Assessment: Pt is a 63 y.o. female seen for OT evaluation s/p adm to [facility] on 12/4/2024 w/ Primary osteoarthritis of right knee . Comorbidities affecting pt’s functional performance include a significant PMH of arthritis, hyperlipidemia, HTN. Pt with active OT orders and activity orders for Activity beginning POD #0. WBAT RLE. Pt lives with spouse in a multilevel house with 2 [place]. Pt has first floor bathroom. At baseline, pt was independent with all  ADLs/IADLs. Pt completed supine to sit with supervision. Pt completed don of underwear and pants with supervision, then standing to pull over waist with use of RW for stability. Pt completed don of shirt with supervision. Pt completed sit to stand with minAx1 due to RLE knee buckling. Then progressing to supervision with use of RW. Verbal cues for leg extension and proper technique. Pt progressing to supervision with transfers and no knee buckling noted. Pt completed functional mobility functional household distance with use of RW and minAx1 progressing to supervision, no knee buckling noted. Pt comfortable DC'ing home, declined staying overnight. RN aware. Upon evaluation, pt currently requires S for UB ADLs, S for LB ADLs, S for toileting, S for bed mobility, S for functional mobility, and S for transfers 2* the following deficits impacting occupational performance: weakness, decreased strength , decreased balance, decreased activity tolerance, increased pain, and orthopedic restrictions. These impairments, as well at pt’s personal factors of: R[place]ET[place] home environment, steps within home environment, difficulty performing ADLs, difficulty performing IADLs, difficulty performing transfers/mobility, WBS, fall risk , and new use of AD for functional transfers/mobility limit pt’s ability to safely engage in all baseline areas of occupation. Based on the aforementioned OT evaluation, functional performance deficits, and assessments, pt has been identified as a high complexity evaluation. Pt to continue to benefit from continued acute OT services during hospital stay to address defined deficits and to maximize level of functional independence in the following Occupational Performance areas: grooming, bathing/shower, toilet hygiene, dressing, health maintenance, functional mobility, community mobility, clothing management, cleaning, household maintenance, and job performance/volunteering. From OT standpoint, recommend No  post-acute rehabilitation needs upon D/C. OT will continue to follow pt 3-5x/wk.     Rehab Resource Intensity Level, OT: No post-acute rehabilitation needs

## 2024-12-04 NOTE — ANESTHESIA POSTPROCEDURE EVALUATION
Post-Op Assessment Note    CV Status:  Stable  Pain Score: 0    Pain management: adequate       Mental Status:  Alert and awake   Hydration Status:  Euvolemic   PONV Controlled:  Controlled   Airway Patency:  Patent     Post Op Vitals Reviewed: Yes    No anethesia notable event occurred.    Staff: Anesthesiologist, CRNA           Last Filed PACU Vitals:  Vitals Value Taken Time   Temp  97.2   Pulse 83 12/04/24 1233   /56 12/04/24 1232   Resp 21 12/04/24 1233   SpO2 96 % 12/04/24 1233   Vitals shown include unfiled device data.    Modified Terell:  No data recorded

## 2024-12-04 NOTE — PHYSICAL THERAPY NOTE
PT EVALUATION    Pt. Name: Diane Pike  Pt. Age: 63 y.o.  MRN: [id number]  LENGTH OF STAY: 0    Patient Active Problem List   Diagnosis    Bilateral hip pain    Hypertension    Primary osteoarthritis of right knee    Tobacco use       Admitting Diagnoses:   Primary osteoarthritis of right knee [M17.11]    Past Medical History:   Diagnosis Date    Arthritis     Hyperlipidemia     Hypertension        Past Surgical History:   Procedure Laterality Date    BREAST BIOPSY Left     COLONOSCOPY      ENDOMETRIAL ABLATION      EXPLORATORY LAPAROTOMY W/ BOWEL RESECTION N/A 07/06/2022    Procedure: LAPAROTOMY EXPLORATORY, LYSIS OF ADHESIONS;  Surgeon: Anibal Souza DO;  Location: [facility];  Service: General    TUBAL LIGATION         Imaging Studies:  XR knee right 1 or 2 views    (Results Pending)         12/04/24 1407   PT Last Visit   PT Visit Date 12/04/24   Note Type   Note type Evaluation   Additional Comments pt greeted in supine   Pain Assessment   Pain Assessment Tool 0-10   Pain Score 4   Pain Location/Orientation Orientation: Right;Location: Knee   Hospital Pain Intervention(s) Repositioned;Ambulation/increased activity;Elevated;Emotional support;Rest   Restrictions/Precautions   Weight Bearing Precautions Per Order Yes   RLE Weight Bearing Per Order WBAT   Other Precautions WBS;Fall Risk;Pain   Home Living   Type of Home House   Home Layout Multi-level;1/2 bath on main level;Performs ADLs on one level;Bed/bath upstairs  (0 RENETTA from the back, 2 RENETTA)   Bathroom Shower/Tub Tub/shower unit   Bathroom Toilet Standard   Bathroom Equipment   (denies DME)   Bathroom Accessibility Accessible   Home Equipment Walker;Cane   Prior Function   Level of [place] Independent with ADLs;Independent with functional mobility;Independent with IADLS   Lives With Spouse   Receives Help From Family   IADLs Independent with driving;Independent with medication management;Independent with meal prep   Falls in the last 6  months 0   Vocational Full time employment  ([employer], 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 on average)   Comments (+)    General   Family/Caregiver Present No   Cognition   Overall Cognitive Status WFL   Arousal/Participation Alert   Orientation Level Oriented X4   Following Commands Follows all commands and directions without difficulty   Comments pt pleasant   RUE Assessment   RUE Assessment   (see OT note)   LUE Assessment   LUE Assessment   (see OT note)   RLE Assessment   RLE Assessment X  (did not assess knee due to post op, able to flex hip and move ankle through normal ROM)   LLE Assessment   LLE Assessment WFL   Light Touch   RLE Light Touch Grossly intact   LLE Light Touch Grossly intact   Bed Mobility   Supine to Sit 5  Supervision   Additional items Increased time required;Verbal cues;Bedrails   Sit to Supine 5  Supervision   Additional items Increased time required;Verbal cues;Bedrails   Transfers   Sit to Stand 4  Minimal assistance   Additional items Assist x 1;Increased time required;Verbal cues  (progressing to S, RW)   Stand to Sit 4  Minimal assistance   Additional items Assist x 1;Increased time required;Verbal cues  (progressing to S, RW)   Additional Comments (S)  cues for leg extension in standing, buckling noted in  standing with RW. Cues needed for RW safety   Ambulation/Elevation   Gait pattern Improper Weight shift;Antalgic;Decreased R stance;Excessively slow;Decreased heel strike;Decreased toe off;R Knee Miguel Angel   Gait Assistance 4  Minimal assist   Additional items Assist x 1;Verbal cues  (progressing to S)   Assistive Device Rolling walker   Distance 80'x2   Stair Management Assistance 4  Minimal assist   Additional items Assist x 1;Increased time required;Verbal cues  (progressing to S)   Stair Management Technique No rails;Step to pattern;Foreward;With walker   Number of Stairs 4   Ambulation/Elevation Additional Comments (S)  inconsistent buckling noted in R LE during first few steps of  ambulation. Improved to S and no buckling following cues from therapist. Pt able to perform 80'x1 with no buckling and S.   Balance   Static Sitting Good   Dynamic Sitting Fair +   Static Standing Fair   Dynamic Standing Fair -   Ambulatory Fair -   Endurance Deficit   Endurance Deficit Yes   Endurance Deficit Description fatigue   Activity Tolerance   Activity Tolerance Patient tolerated treatment well   Medical Staff Made Aware RN SUSHIL Singh   Nurse Made Aware yes   Assessment   Prognosis Good   Problem List Decreased strength;Decreased range of motion;Decreased endurance;Impaired balance;Decreased mobility;Orthopedic restrictions;Pain   Assessment Pt is a 63 y.o. female who presented to [facility] on 12/4/2024 s/p R Unicompartmental Arthroplasty done by Dr. Harrison. Precautions include WBAT. Pt  has a past medical history of Arthritis, Hyperlipidemia, and Hypertension.. Pt greeted at bedside for PT evaluation on 12/04/24. Pt referred to PT for functional mobility evaluation & D/C planning w/ orders of activity beginning POD #0 and activity as tolerated. PTA, pt reports being I w/o AD and I w/ IADLs. Personal factors affecting pt at time of IE include: lives in 2 story house and stairs to enter home. Please find objective findings from PT assessment regarding body systems outlined above with impairments and limitations including weakness, decreased ROM, impaired balance, decreased endurance, gait deviations, pain, decreased activity tolerance, decreased functional mobility tolerance, fall risk, and orthopedic restrictions.  Please see flow sheet above for objective findings and level of assistance required for safe completion of functional tasks. Pt able to perform supine<>sit with S and use of bedrails. Pt able to perform sit<>stand with minAx1 progressing to S. Pt's R leg had inconsistent buckling in standing and during ambulation, but improved with cues from therapist. Pt able to ambulate 80'x2 with minAx1  progressing to S with RW. Pt needed mod. Cues for RW safety and LE sequencing during ambulation. Pt then performed 4 steps with minAx1 progressing to S for last 2 steps. No buckling noted on last step and last 80' of ambulating with RW. Pt comfortable DC'ing home, declined staying overnight. RN aware. Pt demonstrated dec endurance and tolerance to activity. Denies reports of dizziness or SOB t/o session. Patient was left supine in bed with call bell and all needs within reach. Pt was educated on fall precautions and reinforced w/ good understanding. Based on pt presentation and impaired function, pt would benefit from level III, (minimal resource intensity) at D/C. From PT/mobility standpoint, pt would benefit from OPPT to address deficits as defined above and maximize level of independence and return pt to PLOF. HEP given and reviewed with patient, no questions at this time. CM to follow. Nsg staff to continue to mobilized pt (OOB in chair for all meals & ambulate in room/unit) as tolerated to prevent further decline in function. Nsg notified. Co-eval performed with OT to complete this evaluation for the pts best interest given pts medical complexity and functional level.   Barriers to Discharge Inaccessible home environment  (RENETTA)   Goals   Patient Goals to go home   STG Expiration Date 12/11/24   Short Term Goal #1 1).  Pt will perform bed mobility with Iza demonstrating appropriate technique 100% of the time in order to improve function.2)  Perform all transfers with Iza demonstrating safe and appropriate technique 100% of the time in order to improve ability to negotiate safely in home environment.3) Amb with least restrictive AD > 200'x1 with Iza in order to demonstrate ability to negotiate in home environment.4)  Improve overall strength and balance 1/2 grade in order to optimize ability to perform functional tasks and reduce fall risk.5) Increase activity tolerance to 45 minutes in order to improve  endurance to functional tasks.6)  Negotiate stairs using most appropriate technique and Iza in order to be able to negotiate safely in home environment. 7) PT for ongoing patient and family/caregiver education, DME needs and d/c planning in order to promote highest level of function in least restrictive environment.   Plan   Treatment/Interventions Functional transfer training;LE strengthening/ROM;Therapeutic exercise;Endurance training;Family;OT;Spoke to nursing;Bed mobility;Gait training   PT Frequency Twice a day  (prn)   Discharge Recommendation   Rehab Resource Intensity Level, PT III (Minimum Resource Intensity)   Equipment Recommended Walker  (pt owns)   Additional Comments The patient's AM-PAC Basic Mobility Inpatient Short Form Raw Score is 20. A Raw score of greater than 16 suggests the patient may benefit from discharge to home. Please also refer to the recommendation of the Physical Therapist for safe discharge planning.   AM-PAC Basic Mobility Inpatient   Turning in Flat Bed Without Bedrails 4   Lying on Back to Sitting on Edge of Flat Bed Without Bedrails 4   Moving Bed to Chair 3   Standing Up From Chair Using Arms 3   Walk in Room 3   Climb 3-5 Stairs With Railing 3   Basic Mobility Inpatient Raw Score 20   Basic Mobility Standardized Score 43.99   [facility] Highest Level Of Mobility   -HLM Goal 6: Walk 10 steps or more   JH-HLM Achieved 7: Walk 25 feet or more   End of Consult   Patient Position at End of Consult Supine;All needs within reach   End of Consult Comments pt stable, left in supine with  in room. RN updated     Hx/personal factors: co-morbidities, inaccessible home, pain, and fall risk  Examination: dec mobility, dec balance, dec endurance, dec amb, risk for falls, pain, assessed body system, balance, endurance, amb, D/C disposition & fall risk, WB restrictions, impairements in locomotion, musculoskeletal, balance, endurance, posture, coordination  Clinical: unpredictable  (ongoing medical status, risk for falls, POD #0, and pain mgt)  Complexity: high  Lolly Ryan, PT

## 2024-12-04 NOTE — INTERVAL H&P NOTE
H&P reviewed. After examining the patient I find no changes in the patients condition since the H&P had been written.    Vitals:    12/04/24 0932   BP: 161/79   Pulse: 69   Resp: 16   Temp: 98.3 °F (36.8 °C)   SpO2: 99%

## 2024-12-04 NOTE — ANESTHESIA PROCEDURE NOTES
Peripheral Block    Patient location during procedure: holding area  Start time: 12/4/2024 10:30 AM  Reason for block: at surgeon's request and post-op pain management  Staffing  Performed by: Mak Uriostegui MD  Authorized by: Mak Uriostegui MD    Preanesthetic Checklist  Completed: patient identified, IV checked, site marked, risks and benefits discussed, surgical consent, monitors and equipment checked, pre-op evaluation and timeout performed  Peripheral Block  Patient position: supine  Prep: ChloraPrep  Patient monitoring: frequent blood pressure checks, continuous pulse oximetry and heart rate  Block type: Adductor Canal  Laterality: right  Injection technique: single-shot  Procedures: ultrasound guided, Ultrasound guidance required for the procedure to increase accuracy and safety of medication placement and decrease risk of complications.  Ultrasound permanent image saved  bupivacaine (PF) (MARCAINE) 0.5 % injection 20 mL - Perineural   10 mL - 12/4/2024 10:35:00 AM  bupivacaine liposomal (EXPAREL) 1.3 % injection 20 mL - Perineural   20 mL - 12/4/2024 10:35:00 AM  midazolam (VERSED) injection 0.5 mg - Intravenous   2 mg - 12/4/2024 10:30:00 AM  Needle  Needle type: Stimuplex   Needle gauge: 20 G  Needle length: 4 in  Needle localization: anatomical landmarks and ultrasound guidance  Assessment  Injection assessment: incremental injection, frequent aspiration, injected with ease, negative aspiration, negative for heart rate change, no paresthesia on injection, no symptoms of intraneural/intravenous injection and needle tip visualized at all times  Paresthesia pain: none  Post-procedure:  site cleaned  patient tolerated the procedure well with no immediate complications

## 2024-12-04 NOTE — PLAN OF CARE
Problem: PHYSICAL THERAPY ADULT  Goal: Performs mobility at highest level of function for planned discharge setting.  See evaluation for individualized goals.  Description: Treatment/Interventions: Functional transfer training, LE strengthening/ROM, Therapeutic exercise, Endurance training, Family, OT, Spoke to nursing, Bed mobility, Gait training  Equipment Recommended: Walker (pt owns)       See flowsheet documentation for full assessment, interventions and recommendations.  Note: Prognosis: Good  Problem List: Decreased strength, Decreased range of motion, Decreased endurance, Impaired balance, Decreased mobility, Orthopedic restrictions, Pain  Assessment: Pt is a 63 y.o. female who presented [facility] 12/4/2024 s/p R Unicompartmental Arthroplasty done by Dr. Harrison. Precautions include WBAT. Pt  has a past medical history of Arthritis, Hyperlipidemia, and Hypertension.. Pt greeted at bedside for PT evaluation on 12/04/24. Pt referred to PT for functional mobility evaluation & D/C planning w/ orders of activity beginning POD #0 and activity as tolerated. PTA, pt reports being I w/o AD and I w/ IADLs. Personal factors affecting pt at time of IE include: lives in 2 story house and stairs to enter home. Please find objective findings from PT assessment regarding body systems outlined above with impairments and limitations including weakness, decreased ROM, impaired balance, decreased endurance, gait deviations, pain, decreased activity tolerance, decreased functional mobility tolerance, fall risk, and orthopedic restrictions.  Please see flow sheet above for objective findings and level of assistance required for safe completion of functional tasks. Pt able to perform supine<>sit with S and use of bedrails. Pt able to perform sit<>stand with minAx1 progressing to S. Pt's R leg had inconsistent buckling in standing and during ambulation, but improved with cues from therapist. Pt able to ambulate 80'x2 with  minAx1 progressing to S with RW. Pt needed mod. Cues for RW safety and LE sequencing during ambulation. Pt then performed 4 steps with minAx1 progressing to S for last 2 steps. No buckling noted on last step and last 80' of ambulating with RW. Pt comfortable DC'ing home, declined staying overnight. RN aware. Pt demonstrated dec endurance and tolerance to activity. Denies reports of dizziness or SOB t/o session. Patient was left supine in bed with call bell and all needs within reach. Pt was educated on fall precautions and reinforced w/ good understanding. Based on pt presentation and impaired function, pt would benefit from level III, (minimal resource intensity) at D/C. From PT/mobility standpoint, pt would benefit from OPPT to address deficits as defined above and maximize level of independence and return pt to PLOF. HEP given and reviewed with patient, no questions at this time. CM to follow. Nsg staff to continue to mobilized pt (OOB in chair for all meals & ambulate in room/unit) as tolerated to prevent further decline in function. Nsg notified. Co-eval performed with OT to complete this evaluation for the pts best interest given pts medical complexity and functional level.  Barriers to Discharge: Inaccessible home environment (RENETTA)     Rehab Resource Intensity Level, PT: III (Minimum Resource Intensity)    See flowsheet documentation for full assessment.

## 2024-12-04 NOTE — OCCUPATIONAL THERAPY NOTE
Occupational Therapy Evaluation     Patient Name: Diane Pike  Today's Date: 12/4/2024  Problem List  Principal Problem:    Primary osteoarthritis of right knee    Past Medical History  Past Medical History:   Diagnosis Date    Arthritis     Hyperlipidemia     Hypertension      Past Surgical History  Past Surgical History:   Procedure Laterality Date    BREAST BIOPSY Left     COLONOSCOPY      ENDOMETRIAL ABLATION      EXPLORATORY LAPAROTOMY W/ BOWEL RESECTION N/A 07/06/2022    Procedure: LAPAROTOMY EXPLORATORY, LYSIS OF ADHESIONS;  Surgeon: Anibal Souza DO;  [facility]: [facility];  Service: General    TUBAL LIGATION          12/04/24 1406   OT Last Visit   OT Visit Date 12/04/24   Note Type   Note type Evaluation   Additional Comments pt greeted in supine, agreeable to OT evaluation   Pain Assessment   Pain Assessment Tool 0-10   Pain Score 4   Pain Location/Orientation Orientation: Right;Location: Knee   Hospital Pain Intervention(s) Repositioned;Ambulation/increased activity;Emotional support;Rest   Restrictions/Precautions   Weight Bearing Precautions Per Order Yes   RLE Weight Bearing Per Order WBAT   Other Precautions WBS;Multiple lines;Fall Risk;Pain;Telemetry   Home Living   Type of Home House   Home Layout Multi-level;Performs ADLs on one level;1/2 bath on main level;Bed/bath upstairs  (0 RENETTA)   Bathroom Shower/Tub Tub/shower unit   Bathroom Toilet Standard   Bathroom Equipment Other (Comment)  (Denies DME)   Bathroom Accessibility Accessible   Home Equipment Walker;Cane   Prior Function   Level of [place] Independent with ADLs;Independent with functional mobility;Independent with IADLS   Lives With Spouse   Receives Help From Family   IADLs Independent with driving;Independent with medication management;Independent with meal prep   Falls in the last 6 months 0   Vocational Full time employment  (warehouse, 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)   Comments (+)    Lifestyle   Autonomy Independent with all  ADLs/IADLs, no AD use at baseline   Reciprocal Relationships Spouse   Service to Others FTE   Intrinsic Gratification swimming   General   Family/Caregiver Present No   ADL   Where Assessed Edge of bed   Eating Assistance 5  Supervision/Setup   Grooming Assistance 5  Supervision/Setup   UB Bathing Assistance 5  Supervision/Setup   LB Bathing Assistance 5  Supervision/Setup   UB Dressing Assistance 5  Supervision/Setup   LB Dressing Assistance 5  Supervision/Setup   Toileting Assistance  5  Supervision/Setup   Bed Mobility   Supine to Sit 5  Supervision   Additional items Increased time required;Verbal cues;Bedrails   Sit to Supine 5  Supervision   Additional items Bedrails;Increased time required;Verbal cues   Additional Comments Pt greeted in supine, ended session in supine. /59.   Transfers   Sit to Stand 4  Minimal assistance   Additional items Assist x 1;Increased time required;Verbal cues  (RW, progressing to supervision)   Stand to Sit 4  Minimal assistance   Additional items Assist x 1;Increased time required;Verbal cues  (RW, progressing to supervision)   Additional Comments (S)  verbal cues for hand placement and safety with RW; Verbal cues for leg extension and proper technique with RW during transfers required due to RLE knee buckling noted in first few transfers. Pt progressing to supervision with transfers and no knee buckling noted.   Functional Mobility   Functional Mobility 4  Minimal assistance   Additional Comments Pt completed functional mobility functional household distance with use of RW and minAx1 progressing to supervision. Pt with RLE knee buckling in beginning then progressing to supervision and no knee buckling noted.   Additional items Rolling walker   Balance   Static Sitting Good   Dynamic Sitting Fair +   Static Standing Fair   Dynamic Standing Fair -   Ambulatory Fair -   Activity Tolerance   Activity Tolerance Patient tolerated treatment well   Medical Staff Made Aware PT Jenise  Pt seen for co-evaluation/treatment with skilled Physical Therapy due to pt's medical complexity, decreased endurance, overall functional level, overall safety, and post surgical day #0.   Nurse Made Aware PJ SMITH Assessment   RUE Assessment WFL   LUE Assessment   LUE Assessment WFL   Hand Function   Gross Motor Coordination Functional   Fine Motor Coordination Functional   Cognition   Overall Cognitive Status WFL   Arousal/Participation Alert;Cooperative   Attention Within functional limits   Orientation Level Oriented X4   Memory Within functional limits   Following Commands Follows all commands and directions without difficulty   Comments Cooperative and pleasant   Assessment   Limitation Decreased ADL status;Decreased endurance;Decreased self-care trans;Decreased high-level ADLs   Prognosis Good   Assessment Pt is a 63 y.o. female seen for OT evaluation s/p adm to [facility] on 12/4/2024 w/ Primary osteoarthritis of right knee . Comorbidities affecting pt’s functional performance include a significant PMH of arthritis, hyperlipidemia, HTN. Pt with active OT orders and activity orders for Activity beginning POD #0. WBAT RLE. Pt lives with spouse in a multilevel house with 2 RENETTA. Pt has first floor bathroom. At baseline, pt was independent with all ADLs/IADLs. Pt completed supine to sit with supervision. Pt completed don of underwear and pants with supervision, then standing to pull over waist with use of RW for stability. Pt completed don of shirt with supervision. Pt completed sit to stand with minAx1 due to RLE knee buckling. Then progressing to supervision with use of RW. Verbal cues for leg extension and proper technique. Pt progressing to supervision with transfers and no knee buckling noted. Pt completed functional mobility functional household distance with use of RW and minAx1 progressing to supervision, no knee buckling noted. Pt comfortable DC'ing home, declined staying overnight. RN aware.  Upon evaluation, pt currently requires S for UB ADLs, S for LB ADLs, S for toileting, S for bed mobility, S for functional mobility, and S for transfers 2* the following deficits impacting occupational performance: weakness, decreased strength , decreased balance, decreased activity tolerance, increased pain, and orthopedic restrictions. These impairments, as well at pt’s personal factors of: RENETTA home environment, steps within home environment, difficulty performing ADLs, difficulty performing IADLs, difficulty performing transfers/mobility, WBS, fall risk , and new use of AD for functional transfers/mobility limit pt’s ability to safely engage in all baseline areas of occupation. Based on the aforementioned OT evaluation, functional performance deficits, and assessments, pt has been identified as a high complexity evaluation. Pt to continue to benefit from continued acute OT services during hospital stay to address defined deficits and to maximize level of functional independence in the following Occupational Performance areas: grooming, bathing/shower, toilet hygiene, dressing, health maintenance, functional mobility, community mobility, clothing management, cleaning, household maintenance, and job performance/volunteering. From OT standpoint, recommend No post-acute rehabilitation needs upon D/C. OT will continue to follow pt 3-5x/wk.   Goals   Patient Goals to go home   STG Time Frame 1-3   Short Term Goal #1 Pt will improve activity tolerance to G for min 30 min treatment sessions for increase engagement in functional tasks   Short Term Goal #2 Pt will complete bed mobility at a mod I level w/ G balance/safety demonstrated to decrease caregiver assistance required   Short Term Goal  Pt will complete LB dressing/self care w/ mod I using adaptive device and DME as needed   LTG Time Frame 3-7   Long Term Goal #1 Pt will complete toileting w/ mod I w/ G hygiene/thoroughness using DME as needed   Long Term Goal #2  Pt will improve functional transfers to mod I on/off all surfaces using DME as needed w/ G balance/safety   Long Term Goal Pt will improve functional mobility during ADL/IADL/leisure tasks to mod I using DME as needed w/ G balance/safety   Plan   Treatment Interventions ADL retraining;Functional transfer training;Endurance training;Patient/family training;Equipment evaluation/education;Compensatory technique education;Continued evaluation;Activityengagement;Energy conservation   Goal Expiration Date 12/11/24   OT Treatment Day 0   OT Frequency 3-5x/wk   Discharge Recommendation   Rehab Resource Intensity Level, OT No post-acute rehabilitation needs   Additional Comments  The patient's raw score on the AM-PAC Daily Activity Inpatient Short Form is 21 . A raw score of greater than or equal to 19 suggests the patient may benefit from discharge to home. Please refer to the recommendation of the Occupational Therapist for safe discharge planning.   AM-PAC Daily Activity Inpatient   Lower Body Dressing 3   Bathing 3   Toileting 3   Upper Body Dressing 4   Grooming 4   Eating 4   Daily Activity Raw Score 21   Daily Activity Standardized Score (Calc for Raw Score >=11) 44.27   AM-PAC Applied Cognition Inpatient   Following a Speech/Presentation 4   Understanding Ordinary Conversation 4   Taking Medications 4   Remembering Where Things Are Placed or Put Away 4   Remembering List of 4-5 Errands 4   Taking Care of Complicated Tasks 4   Applied Cognition Raw Score 24   Applied Cognition Standardized Score 62.21   End of Consult   Education Provided Yes;Family or social support of family present for education by provider   Patient Position at End of Consult Supine;All needs within reach   Nurse Communication Nurse aware of consult   End of Consult Comments Pt lying supine at end of session. Call bell and phone within reach. All needs met and pt reports no further questions for OT at this time.   Jodie Recio, OT

## 2024-12-05 ENCOUNTER — TELEPHONE (OUTPATIENT)
Dept: OBGYN CLINIC | Facility: HOSPITAL | Age: 64
End: 2024-12-05

## 2024-12-05 NOTE — TELEPHONE ENCOUNTER
Patient contacted for a postoperative follow up assessment. Patient states current pain level of a 6/10 when sitting and 7/10 when walking with RW.  Patient denies increase in swelling and dressing is Dressing C/D/I Patient isicing the site regularly. NN educated patient on post-op bruising, swelling, and icing.     We reviewed patients AVS medication list. Patient is taking Tylenol 1000mg every 8 hours, Oxycodone 5mg PRN, ASA 81mg BID, and Celebrex 200mg BID.Patient has not had a BM but is passing gas.       Patient denies nausea, vomiting, abdominal pain, chest pain, shortness of breath, fever, dizziness, and calf pain. Patient confirmed post-op appointment with surgeon on 12/19 at 8:45AM.Patient does not have any other questions or concerns at this time. Pt was encouraged to call with any questions, concerns or issues.

## 2024-12-06 ENCOUNTER — TELEPHONE (OUTPATIENT)
Age: 64
End: 2024-12-06

## 2024-12-06 NOTE — TELEPHONE ENCOUNTER
Caller: patient    Doctor: sarah beth    Reason for call: patient had PT today and is having trouble lifting leg and knee is swollen, patient would like to know if this is normal  Please advise     Call back#: [phone]

## 2024-12-19 ENCOUNTER — OFFICE VISIT (OUTPATIENT)
Dept: OBGYN CLINIC | Facility: CLINIC | Age: 64
End: 2024-12-19

## 2024-12-19 VITALS
DIASTOLIC BLOOD PRESSURE: 74 MMHG | HEART RATE: 90 BPM | WEIGHT: 144 LBS | BODY MASS INDEX: 25.52 KG/M2 | SYSTOLIC BLOOD PRESSURE: 117 MMHG | HEIGHT: 63 IN

## 2024-12-19 DIAGNOSIS — Z96.651 S/P RIGHT UNICOMPARTMENTAL KNEE REPLACEMENT: Primary | ICD-10-CM

## 2024-12-19 PROCEDURE — 99024 POSTOP FOLLOW-UP VISIT: CPT | Performed by: STUDENT IN AN ORGANIZED HEALTH CARE EDUCATION/TRAINING PROGRAM

## 2024-12-19 NOTE — ASSESSMENT & PLAN NOTE
Patient is 2 weeks s/p right unicompartmental knee arthroplasty  - WBAT RLE - wean off assistive devices as tolerated   - Tylenol and Celebrex for pain control prn  - No formal physical therapy - continue advancing activities to tolerance, daily stretching for range of motion   - ASA 81mg BID for dvt ppx   - May shower, do not soak or submerge incision  - RTC in 4 weeks. right knee xrays needed

## 2024-12-19 NOTE — PROGRESS NOTES
Date: 24  Diane Pike   MRN# [id number]  : 1960      Chief Complaint: Post op right unicompartmental knee arthroplasty    Assessment and Plan:    S/P right unicompartmental knee replacement  Patient is 2 weeks s/p right unicompartmental knee arthroplasty  - WBAT RLE - wean off assistive devices as tolerated   - Tylenol and Celebrex for pain control prn  - No formal physical therapy - continue advancing activities to tolerance, daily stretching for range of motion   - ASA 81mg BID for dvt ppx   - May shower, do not soak or submerge incision  - RTC in 4 weeks. right knee xrays needed         Subjective:   Patient is 2 weeks s/p right unicompartmental knee arthroplasty    Date of procedure: 24  Doing well, no new problems  Pain progressively improving  Improved swelling  Ambulating with no assistive device    Allergy:  No Known Allergies  Medications:  all current active meds have been reviewed    Past Medical History:  Past Medical History:   Diagnosis Date    Arthritis     Hyperlipidemia     Hypertension      Past Surgical History:  Past Surgical History:   Procedure Laterality Date    BREAST BIOPSY Left     COLONOSCOPY      ENDOMETRIAL ABLATION      EXPLORATORY LAPAROTOMY W/ BOWEL RESECTION N/A 2022    Procedure: LAPAROTOMY EXPLORATORY, LYSIS OF ADHESIONS;  Surgeon: Anibal Souza DO;  [facility];  Service: General    [place] ARTHRP KNEE CONDYLE&PLATEAU MEDIAL/LAT CMPRT Right 2024    Procedure: ARTHROPLASTY KNEE UNICOMPARTMENTAL, RIGHT;SAME DAY;  Surgeon: Lee Harrison MD;  Location:  MAIN OR;  Service: Orthopedics    TUBAL LIGATION       Family History:  Family History   Problem Relation Age of Onset    Uterine cancer Mother 36    Breast cancer Cousin 55    Breast cancer Cousin         age unknown    Breast cancer Paternal Aunt 52     Social History:  Social History     Substance and Sexual Activity   Alcohol Use Not Currently    Comment: rarely, not even weekly  "    Social History     Substance and Sexual Activity   Drug Use Not Currently     Social History     Tobacco Use   Smoking Status Every Day    Current packs/day: 0.50    Types: Cigarettes   Smokeless Tobacco Never           Review of Systems:  General- denies fever/chills  HEENT- denies hearing loss or sore throat  Eyes- denies eye pain or visual disturbances, denies red eyes  Respiratory- denies cough or SOB  Cardio- denies chest pain or palpitations  GI- denies abdominal pain  Endocrine- denies urinary frequency  Urinary- denies pain with urination  Musculoskeletal- Negative except noted above  Skin- denies rashes or wounds  Neurological- denies dizziness or headache  Psychiatric- denies anxiety or difficulty concentrating      Objective:   BP Readings from Last 1 Encounters:   12/19/24 117/74      Wt Readings from Last 1 Encounters:   12/19/24 65.3 kg (144 lb)      Pulse Readings from Last 1 Encounters:   12/19/24 90        BMI: Estimated body mass index is 25.51 kg/m² as calculated from the following:    Height as of this encounter: 5' 3\" (1.6 m).    Weight as of this encounter: 65.3 kg (144 lb).      Physical Exam  /74   Pulse 90   Ht 5' 3\" (1.6 m)   Wt 65.3 kg (144 lb)   BMI 25.51 kg/m²   General/Constitutional: No apparent distress: well-nourished and well developed.  Eyes: normal ocular motion  Cardio: RRR, Normal S1S2, No m/r/g.   Lymphatic: No appreciable lymphadenopathy  Respiratory: Non-labored breathing, CTA b/l no w/c/r  Vascular: No edema, swelling or tenderness, except as noted in detailed exam. Extremities well perfused. No LE edema  Integumentary: No impressive skin lesions present, except as noted in detailed exam.  Neuro: No ataxia or tremors noted  Psych: Normal mood and affect, oriented to person, place and time. Appropriate affect.  Musculoskeletal: Normal, except as noted in detailed exam and in HPI.    Gait and Station:   normal    right Knee Examination  Incision: clean, dry, and " intact  Effusion: mild  Alignment: normal  AP Stability: stable  Coronal Stability  Extension:stable  Mid-flexion: stable  90 degrees flexion: stable  Range of motion  Active: 5 to 110    Extensor lag: Absent  Motor: 5/5 EHL/FHL/TA/GS/QD/HS  Neurovascular exam is intact.   No evidence of calf tightness or posterior cords    Images:  No new imaging        Scribe Attestation      I,:  Orlando Street PA-C am acting as a scribe while in the presence of the attending physician.:       I,:  Lee Harrison MD personally performed the services described in this documentation    as scribed in my presence.:               Lee Harrison MD  Adult Reconstruction Specialist   [facility]

## 2025-01-02 ENCOUNTER — HOSPITAL ENCOUNTER (OUTPATIENT)
Dept: RADIOLOGY | Age: 65
Discharge: HOME | End: 2025-01-02
Payer: COMMERCIAL

## 2025-01-02 VITALS — WEIGHT: 143 LBS | HEIGHT: 63 IN | BODY MASS INDEX: 25.34 KG/M2

## 2025-01-02 DIAGNOSIS — Z12.31 ENCOUNTER FOR SCREENING MAMMOGRAM FOR MALIGNANT NEOPLASM OF BREAST: ICD-10-CM

## 2025-01-02 PROCEDURE — 77063 BREAST TOMOSYNTHESIS BI: CPT

## 2025-01-02 PROCEDURE — 77067 SCR MAMMO BI INCL CAD: CPT

## 2025-01-16 ENCOUNTER — OFFICE VISIT (OUTPATIENT)
Dept: OBGYN CLINIC | Facility: CLINIC | Age: 65
End: 2025-01-16

## 2025-01-16 ENCOUNTER — APPOINTMENT (OUTPATIENT)
Dept: RADIOLOGY | Age: 65
End: 2025-01-16
Payer: COMMERCIAL

## 2025-01-16 VITALS — BODY MASS INDEX: 26.93 KG/M2 | WEIGHT: 152 LBS | HEIGHT: 63 IN

## 2025-01-16 DIAGNOSIS — Z96.651 S/P RIGHT UNICOMPARTMENTAL KNEE REPLACEMENT: Primary | ICD-10-CM

## 2025-01-16 DIAGNOSIS — Z96.651 S/P RIGHT UNICOMPARTMENTAL KNEE REPLACEMENT: ICD-10-CM

## 2025-01-16 PROCEDURE — 99024 POSTOP FOLLOW-UP VISIT: CPT | Performed by: STUDENT IN AN ORGANIZED HEALTH CARE EDUCATION/TRAINING PROGRAM

## 2025-01-16 PROCEDURE — 73562 X-RAY EXAM OF KNEE 3: CPT

## 2025-01-16 NOTE — PROGRESS NOTES
Date: 25  Diane Pike   MRN# [id number]  : 1960      Chief Complaint: Post op right unicompartmental knee arthroplasty    Assessment and Plan:    S/P right unicompartmental knee replacement  Patient is 6 weeks s/p right unicompartmental knee arthroplasty  - WBAT RLE   - Tylenol and Celebrex for pain control prn  - No formal physical therapy   - continue advancing activities to tolerance, daily stretching for range of motion   -May discontinue aspirin  -No antibiotic prophylaxis required prior to planned dental work  - May shower and submerge the incision  - RTC in 6 weeks for continued postoperative evaluation and treatment         Subjective:   Patient is 6 weeks s/p right unicompartmental knee arthroplasty    Date of procedure: 24  Doing well, no new problems  Pain progressively improving  Improved swelling  Ambulating with no assistive device    Allergy:  No Known Allergies  Medications:  all current active meds have been reviewed    Past Medical History:  Past Medical History:   Diagnosis Date    Arthritis     Hyperlipidemia     Hypertension      Past Surgical History:  Past Surgical History:   Procedure Laterality Date    BREAST BIOPSY Left     COLONOSCOPY      ENDOMETRIAL ABLATION      EXPLORATORY LAPAROTOMY W/ BOWEL RESECTION N/A 2022    Procedure: LAPAROTOMY EXPLORATORY, LYSIS OF ADHESIONS;  Surgeon: Anibal Souza DO;  [facility];  Service: General    [place] ARTHRP KNEE CONDYLE&PLATEAU MEDIAL/LAT CMPRT Right 2024    Procedure: ARTHROPLASTY KNEE UNICOMPARTMENTAL, RIGHT;SAME DAY;  Surgeon: Lee Harrison MD;  Location:  MAIN OR;  Service: Orthopedics    TUBAL LIGATION       Family History:  Family History   Problem Relation Age of Onset    Uterine cancer Mother 36    Breast cancer Cousin 55    Breast cancer Cousin         age unknown    Breast cancer Paternal Aunt 52     Social History:  Social History     Substance and Sexual Activity   Alcohol Use Not  "Currently    Comment: rarely, not even weekly     Social History     Substance and Sexual Activity   Drug Use Not Currently     Social History     Tobacco Use   Smoking Status Every Day    Current packs/day: 0.50    Types: Cigarettes   Smokeless Tobacco Never           Review of Systems:  General- denies fever/chills  HEENT- denies hearing loss or sore throat  Eyes- denies eye pain or visual disturbances, denies red eyes  Respiratory- denies cough or SOB  Cardio- denies chest pain or palpitations  GI- denies abdominal pain  Endocrine- denies urinary frequency  Urinary- denies pain with urination  Musculoskeletal- Negative except noted above  Skin- denies rashes or wounds  Neurological- denies dizziness or headache  Psychiatric- denies anxiety or difficulty concentrating      Objective:   BP Readings from Last 1 Encounters:   12/19/24 117/74      Wt Readings from Last 1 Encounters:   01/16/25 68.9 kg (152 lb)      Pulse Readings from Last 1 Encounters:   12/19/24 90        BMI: Estimated body mass index is 26.93 kg/m² as calculated from the following:    Height as of this encounter: 5' 3\" (1.6 m).    Weight as of this encounter: 68.9 kg (152 lb).      Physical Exam  Ht 5' 3\" (1.6 m)   Wt 68.9 kg (152 lb)   BMI 26.93 kg/m²   General/Constitutional: No apparent distress: well-nourished and well developed.  Eyes: normal ocular motion  Cardio: RRR, Normal S1S2, No m/r/g.   Lymphatic: No appreciable lymphadenopathy  Respiratory: Non-labored breathing, CTA b/l no w/c/r  Vascular: No edema, swelling or tenderness, except as noted in detailed exam. Extremities well perfused. No LE edema  Integumentary: No impressive skin lesions present, except as noted in detailed exam.  Neuro: No ataxia or tremors noted  Psych: Normal mood and affect, oriented to person, place and time. Appropriate affect.  Musculoskeletal: Normal, except as noted in detailed exam and in HPI.    Gait and Station:   antalgic    right Knee " Examination  Incision: Well-healed  Effusion: None  Alignment: Neutral  AP Stability: stable  Coronal Stability  Extension:stable  Mid-flexion: stable  90 degrees flexion: stable  Range of motion  Active: 00 to >120    Extensor lag: Absent  Motor: 5/5 EHL/FHL/TA/GS/QD/HS  Neurovascular exam is intact.   No evidence of calf tightness or posterior cords    Images:    I personally reviewed relevant images in the PACS system and my interpretation is as follows:  X-rays of the right knee reveal a unicompartmental knee arthroplasty in appropriate alignment without evidence of migration, wear or loosening.        Lee Harrison MD  Adult Reconstruction Specialist   [facility] [address]

## 2025-01-17 PROBLEM — M17.11 PRIMARY OSTEOARTHRITIS OF RIGHT KNEE: Status: RESOLVED | Noted: 2024-07-02 | Resolved: 2025-01-17

## 2025-01-17 NOTE — ASSESSMENT & PLAN NOTE
Patient is 6 weeks s/p right unicompartmental knee arthroplasty  - WBAT RLE   - Tylenol and Celebrex for pain control prn  - No formal physical therapy   - continue advancing activities to tolerance, daily stretching for range of motion   -May discontinue aspirin  -No antibiotic prophylaxis required prior to planned dental work  - May shower and submerge the incision  - RTC in 6 weeks for continued postoperative evaluation and treatment

## 2025-02-18 ENCOUNTER — HOSPITAL ENCOUNTER (OUTPATIENT)
Dept: MAMMOGRAPHY | Facility: CLINIC | Age: 65
Discharge: HOME | End: 2025-02-18
Payer: COMMERCIAL

## 2025-02-18 ENCOUNTER — HOSPITAL ENCOUNTER (OUTPATIENT)
Dept: ULTRASOUND IMAGING | Facility: CLINIC | Age: 65
Discharge: HOME | End: 2025-02-18
Payer: COMMERCIAL

## 2025-02-18 DIAGNOSIS — R92.8 ABNORMAL SCREENING MAMMOGRAM: ICD-10-CM

## 2025-02-18 PROCEDURE — 77065 DX MAMMO INCL CAD UNI: CPT

## 2025-02-18 PROCEDURE — 76642 ULTRASOUND BREAST LIMITED: CPT

## 2025-02-18 NOTE — PROGRESS NOTES
Met with patient and Dr. Sykes  regarding recommendation for;    __x___ RIGHT ______LEFT      _____Ultrasound guided  ___x___Stereotactic breast biopsy.      __X___Verbalized understanding.    Reviewed clip placement with patient, pt states understanding: Yes: __x__ No: ____  Comments:    Blood thinners:  No: __x___ Yes: ______ What:          Biopsy teaching sheet given:  Yes: ___X___ No: ________    Pt given contact information and adv to call with any questions/needs    Patient advised to arrive at 1330 for a 1400 appointment

## 2025-02-24 NOTE — PROGRESS NOTES
Date: 25  Diane Pike   MRN# [id number]  : 1960      Chief Complaint: Post op right unicompartmental knee arthroplasty    Assessment and Plan:    S/P right unicompartmental knee replacement  Patient is 3 months s/p right unicompartmental knee arthroplasty  - WBAT RLE   - Tylenol and Celebrex for pain control prn  - Continue PT/HEP as directed  - May shower and soak/submerge incision  - No antibiotic dental prophylaxis is necessary  - No restrictions from our standpoint. Let pain be a guide  - RTC in 9 months. right knee xrays needed       Subjective:   Patient is 3 months s/p right unicompartmental knee arthroplasty    Date of procedure: 24  Doing well, no new problems  Pain progressively improving  Improved swelling  Ambulating with no assistive device    Allergy:  No Known Allergies  Medications:  all current active meds have been reviewed    Past Medical History:  Past Medical History:   Diagnosis Date    Arthritis     Hyperlipidemia     Hypertension      Past Surgical History:  Past Surgical History:   Procedure Laterality Date    BREAST BIOPSY Left     COLONOSCOPY      ENDOMETRIAL ABLATION      EXPLORATORY LAPAROTOMY W/ BOWEL RESECTION N/A 2022    Procedure: LAPAROTOMY EXPLORATORY, LYSIS OF ADHESIONS;  Surgeon: Anibal Souza DO;  Location: [facility];  Service: General    UT ARTHRP KNEE CONDYLE&PLATEAU MEDIAL/LAT CMPRT Right 2024    Procedure: ARTHROPLASTY KNEE UNICOMPARTMENTAL, RIGHT;SAME DAY;  Surgeon: Lee Harrison MD;  Location:  [facility] OR;  Service: Orthopedics    TUBAL LIGATION       Family History:  Family History   Problem Relation Age of Onset    Uterine cancer Mother 36    Breast cancer Cousin 55    Breast cancer Cousin         age unknown    Breast cancer Paternal Aunt 52     Social History:  Social History     Substance and Sexual Activity   Alcohol Use Not Currently    Comment: rarely, not even weekly     Social History     Substance and Sexual  "Activity   Drug Use Not Currently     Social History     Tobacco Use   Smoking Status Every Day    Current packs/day: 0.50    Types: Cigarettes   Smokeless Tobacco Never           Review of Systems:  General- denies fever/chills  HEENT- denies hearing loss or sore throat  Eyes- denies eye pain or visual disturbances, denies red eyes  Respiratory- denies cough or SOB  Cardio- denies chest pain or palpitations  GI- denies abdominal pain  Endocrine- denies urinary frequency  Urinary- denies pain with urination  Musculoskeletal- Negative except noted above  Skin- denies rashes or wounds  Neurological- denies dizziness or headache  Psychiatric- denies anxiety or difficulty concentrating      Objective:   BP Readings from Last 1 Encounters:   12/19/24 117/74      Wt Readings from Last 1 Encounters:   01/16/25 68.9 kg (152 lb)      Pulse Readings from Last 1 Encounters:   12/19/24 90        BMI: Estimated body mass index is 26.93 kg/m² as calculated from the following:    Height as of 1/16/25: 5' 3\" (1.6 m).    Weight as of 1/16/25: 68.9 kg (152 lb).      Physical Exam  There were no vitals taken for this visit.  General/Constitutional: No apparent distress: well-nourished and well developed.  Eyes: normal ocular motion  Cardio: RRR, Normal S1S2, No m/r/g.   Lymphatic: No appreciable lymphadenopathy  Respiratory: Non-labored breathing, CTA b/l no w/c/r  Vascular: No edema, swelling or tenderness, except as noted in detailed exam. Extremities well perfused. No LE edema  Integumentary: No impressive skin lesions present, except as noted in detailed exam.  Neuro: No ataxia or tremors noted  Psych: Normal mood and affect, oriented to person, place and time. Appropriate affect.  Musculoskeletal: Normal, except as noted in detailed exam and in HPI.    Gait and Station:   antalgic    right Knee Examination  Incision: Well-healed  Effusion: None  Alignment: Neutral  AP Stability: stable  Coronal " Stability  Extension:stable  Mid-flexion: stable  90 degrees flexion: stable  Range of motion  Active: 0 to >120    Extensor lag: Absent  Motor: 5/5 EHL/FHL/TA/GS/QD/HS  Neurovascular exam is intact.   No evidence of calf tightness or posterior cords    Images:  No new imaging           I personally performed the service,   Orlando Street PA-C

## 2025-02-24 NOTE — ASSESSMENT & PLAN NOTE
Patient is 3 months s/p right unicompartmental knee arthroplasty  - WBAT RLE   - Tylenol and Celebrex for pain control prn  - Continue PT/HEP as directed  - May shower and soak/submerge incision  - No antibiotic dental prophylaxis is necessary  - No restrictions from our standpoint. Let pain be a guide  - RTC in 9 months. right knee xrays needed

## 2025-02-25 ENCOUNTER — OFFICE VISIT (OUTPATIENT)
Dept: OBGYN CLINIC | Facility: CLINIC | Age: 65
End: 2025-02-25

## 2025-02-25 VITALS — WEIGHT: 152 LBS | HEIGHT: 63 IN | BODY MASS INDEX: 26.93 KG/M2

## 2025-02-25 DIAGNOSIS — Z96.651 S/P RIGHT UNICOMPARTMENTAL KNEE REPLACEMENT: Primary | ICD-10-CM

## 2025-02-25 PROCEDURE — 99024 POSTOP FOLLOW-UP VISIT: CPT

## 2025-03-08 ENCOUNTER — HOSPITAL ENCOUNTER (OUTPATIENT)
Dept: MAMMOGRAPHY | Facility: CLINIC | Age: 65
Discharge: HOME | End: 2025-03-08
Payer: COMMERCIAL

## 2025-03-08 VITALS
WEIGHT: 152 LBS | HEART RATE: 76 BPM | DIASTOLIC BLOOD PRESSURE: 76 MMHG | BODY MASS INDEX: 26.93 KG/M2 | HEIGHT: 63 IN | SYSTOLIC BLOOD PRESSURE: 136 MMHG

## 2025-03-08 DIAGNOSIS — R92.8 ABNORMAL FINDING ON BREAST IMAGING: ICD-10-CM

## 2025-03-08 PROCEDURE — A4648 IMPLANTABLE TISSUE MARKER: HCPCS

## 2025-03-08 PROCEDURE — 88305 TISSUE EXAM BY PATHOLOGIST: CPT | Performed by: STUDENT IN AN ORGANIZED HEALTH CARE EDUCATION/TRAINING PROGRAM

## 2025-03-08 PROCEDURE — 88342 IMHCHEM/IMCYTCHM 1ST ANTB: CPT | Performed by: STUDENT IN AN ORGANIZED HEALTH CARE EDUCATION/TRAINING PROGRAM

## 2025-03-08 PROCEDURE — 88341 IMHCHEM/IMCYTCHM EA ADD ANTB: CPT | Performed by: STUDENT IN AN ORGANIZED HEALTH CARE EDUCATION/TRAINING PROGRAM

## 2025-03-08 PROCEDURE — 19081 BX BREAST 1ST LESION STRTCTC: CPT

## 2025-03-08 RX ORDER — LIDOCAINE HYDROCHLORIDE AND EPINEPHRINE BITARTRATE 20; .01 MG/ML; MG/ML
10 INJECTION, SOLUTION SUBCUTANEOUS ONCE
Status: COMPLETED | OUTPATIENT
Start: 2025-03-08 | End: 2025-03-08

## 2025-03-08 RX ORDER — LIDOCAINE HYDROCHLORIDE 10 MG/ML
5 INJECTION, SOLUTION EPIDURAL; INFILTRATION; INTRACAUDAL; PERINEURAL ONCE
Status: COMPLETED | OUTPATIENT
Start: 2025-03-08 | End: 2025-03-08

## 2025-03-08 RX ADMIN — LIDOCAINE HYDROCHLORIDE,EPINEPHRINE BITARTRATE 10 ML: 20; .01 INJECTION, SOLUTION INFILTRATION; PERINEURAL at 12:35

## 2025-03-08 RX ADMIN — LIDOCAINE HYDROCHLORIDE 5 ML: 10 INJECTION, SOLUTION EPIDURAL; INFILTRATION; INTRACAUDAL; PERINEURAL at 12:35

## 2025-03-08 NOTE — PROGRESS NOTES
Procedure type:    _____ultrasound guided __x___stereotactic    Breast:    _____Left __x___Right    Location: 6 o'clock     Needle: 10g    # of passes: 6 cores total (1 core with calcs and 5 cores without calcs)    Clip: Brett     Performed by: Dr. Sykes     Pressure held for 5 minutes by: Cyn oWlfe Strips:    ___X__yes _____no    Band aid: (pressure dressing applied with 4x4 gauze and paper tape)    __X___yes_____no    Tolerated procedure:    __X___yes _____no

## 2025-03-10 NOTE — PROGRESS NOTES
Post procedure call completed    Bleeding: _____yes __X___no    Pain: _____yes ___X___no    Redness/Swelling: ______yes ___X___no    Band aid removed: __x___yes _____no     Steri-Strips intact: ___X___yes _____no (discussed with patient to remove steri strips on ... if they have not come off on their own)    Pt with no questions at this time, adv will call when results available, adv to call with any questions or concerns, has name/# for contact

## 2025-03-12 PROCEDURE — 88341 IMHCHEM/IMCYTCHM EA ADD ANTB: CPT | Performed by: STUDENT IN AN ORGANIZED HEALTH CARE EDUCATION/TRAINING PROGRAM

## 2025-03-12 PROCEDURE — 88342 IMHCHEM/IMCYTCHM 1ST ANTB: CPT | Performed by: STUDENT IN AN ORGANIZED HEALTH CARE EDUCATION/TRAINING PROGRAM

## 2025-03-12 PROCEDURE — 88305 TISSUE EXAM BY PATHOLOGIST: CPT | Performed by: STUDENT IN AN ORGANIZED HEALTH CARE EDUCATION/TRAINING PROGRAM

## 2025-03-13 ENCOUNTER — TELEPHONE (OUTPATIENT)
Dept: MAMMOGRAPHY | Facility: CLINIC | Age: 65
End: 2025-03-13

## 2025-04-03 ENCOUNTER — HOSPITAL ENCOUNTER (OUTPATIENT)
Dept: MAMMOGRAPHY | Facility: CLINIC | Age: 65
Discharge: HOME | End: 2025-04-03

## (undated) DEVICE — COBAN 4 IN STERILE

## (undated) DEVICE — SHORT THREADED PINS PACK: Brand: KNEE INSTRUMENTS

## (undated) DEVICE — DRILL PINS L85 PACK: Brand: GMK EFFICIENCY

## (undated) DEVICE — PLUMEPEN PRO 10FT

## (undated) DEVICE — DRESSING MEPILEX AG BORDER POST-OP 4 X 10 IN

## (undated) DEVICE — POOLE SUCTION HANDLE: Brand: CARDINAL HEALTH

## (undated) DEVICE — BETHLEHEM MAJOR GENERAL PACK: Brand: CARDINAL HEALTH

## (undated) DEVICE — CEMENT MIXING SYSTEM WITH FEMORAL BREAKWAY NOZZLE: Brand: REVOLUTION

## (undated) DEVICE — DRESSING MEPILEX AG BORDER POST-OP 4 X 8 IN

## (undated) DEVICE — VEST SURGEON DISPOSABLE

## (undated) DEVICE — SPECIMEN CONTAINER STERILE PEEL PACK

## (undated) DEVICE — 450 ML BOTTLE OF 0.05% CHLORHEXIDINE GLUCONATE IN 99.95% STERILE WATER FOR IRRIGATION, USP AND APPLICATOR.: Brand: IRRISEPT ANTIMICROBIAL WOUND LAVAGE

## (undated) DEVICE — BETHLEHEM UNIV TOTAL KNEE, KIT: Brand: CARDINAL HEALTH

## (undated) DEVICE — GLOVE SRG BIOGEL ECLIPSE 8.5

## (undated) DEVICE — ASTOUND STANDARD SURGICAL GOWN, XL: Brand: CONVERTORS

## (undated) DEVICE — GLOVE INDICATOR PI UNDERGLOVE SZ 9 BLUE

## (undated) DEVICE — HOOD WITH PEEL AWAY FACE SHIELD: Brand: T7PLUS

## (undated) DEVICE — GLOVE INDICATOR PI UNDERGLOVE SZ 8 BLUE

## (undated) DEVICE — ADHESIVE SKIN CLOSURE SYS EXOFIN FUSION 22CM

## (undated) DEVICE — SAW BLADE RECIPROCATING SINGLE SIDED 258 ORTHO

## (undated) DEVICE — ACE WRAP 6 IN UNSTERILE

## (undated) DEVICE — SUT STRATAFIX SPIRAL PDS PLUS 1 CTX 18 IN SXPP1A400

## (undated) DEVICE — SCREW L 20 PACK: Brand: GMK EFFICIENCY

## (undated) DEVICE — SUT STRATAFIX SPIRAL PLUS 3-0 PS-2 30 X 30 CM SXMP2B408

## (undated) DEVICE — DRAPE EQUIPMENT RF WAND

## (undated) DEVICE — DRESSING MEPILEX AG BORDER POST-OP 4 X 12 IN

## (undated) DEVICE — INTENDED FOR TISSUE SEPARATION, AND OTHER PROCEDURES THAT REQUIRE A SHARP SURGICAL BLADE TO PUNCTURE OR CUT.: Brand: BARD-PARKER SAFETY BLADES SIZE 11, STERILE

## (undated) DEVICE — ASTOUND STANDARD SURGICAL GOWN, XXL: Brand: CONVERTORS

## (undated) DEVICE — HANDPIECE SET WITH RETRACTABLE COAXIAL FAN SPRAY TIP AND SUCTION TUBE: Brand: INTERPULSE

## (undated) DEVICE — SCREW L15 PACK: Brand: GMK EFFICIENCY

## (undated) DEVICE — BLADE OSCILLATING SAW 1/2

## (undated) DEVICE — DECANTER: Brand: UNBRANDED

## (undated) DEVICE — PREP SURGICAL PURPREP 26ML

## (undated) DEVICE — CHLORAPREP HI-LITE 26ML ORANGE

## (undated) DEVICE — SYRINGE 50ML LL

## (undated) DEVICE — NEEDLE SPINAL18G X 3.5 IN QUINCKE

## (undated) DEVICE — IMPERVIOUS STOCKINETTE: Brand: DEROYAL

## (undated) DEVICE — GLOVE SRG BIOGEL 7.5

## (undated) DEVICE — SUT VICRYL PLUS 1 CTB-1 36 IN VCPB947H

## (undated) DEVICE — WEBRIL 6 IN UNSTERILE

## (undated) DEVICE — INTENDED FOR TISSUE SEPARATION, AND OTHER PROCEDURES THAT REQUIRE A SHARP SURGICAL BLADE TO PUNCTURE OR CUT.: Brand: BARD-PARKER SAFETY BLADES SIZE 10, STERILE

## (undated) DEVICE — HOOD: Brand: T7PLUS

## (undated) DEVICE — TRAY FOLEY 16FR URIMETER SURESTEP

## (undated) DEVICE — SUT VICRYL 2-0 CT-1 36 IN J945H